# Patient Record
Sex: FEMALE | Race: WHITE | NOT HISPANIC OR LATINO | Employment: OTHER | URBAN - METROPOLITAN AREA
[De-identification: names, ages, dates, MRNs, and addresses within clinical notes are randomized per-mention and may not be internally consistent; named-entity substitution may affect disease eponyms.]

---

## 2017-01-24 ENCOUNTER — CONVERSION ENCOUNTER (OUTPATIENT)
Dept: FAMILY MEDICINE CLINIC | Facility: CLINIC | Age: 77
End: 2017-01-24

## 2018-01-23 ENCOUNTER — CONVERSION ENCOUNTER (OUTPATIENT)
Dept: FAMILY MEDICINE CLINIC | Facility: CLINIC | Age: 78
End: 2018-01-23

## 2018-05-29 ENCOUNTER — OFFICE (AMBULATORY)
Dept: URBAN - METROPOLITAN AREA CLINIC 64 | Facility: CLINIC | Age: 78
End: 2018-05-29
Payer: COMMERCIAL

## 2018-05-29 VITALS
SYSTOLIC BLOOD PRESSURE: 159 MMHG | DIASTOLIC BLOOD PRESSURE: 78 MMHG | HEART RATE: 62 BPM | HEIGHT: 67 IN | WEIGHT: 226 LBS

## 2018-05-29 DIAGNOSIS — K62.5 HEMORRHAGE OF ANUS AND RECTUM: ICD-10-CM

## 2018-05-29 DIAGNOSIS — K90.0 CELIAC DISEASE: ICD-10-CM

## 2018-05-29 PROCEDURE — 99202 OFFICE O/P NEW SF 15 MIN: CPT | Performed by: INTERNAL MEDICINE

## 2018-05-29 RX ORDER — POLYETHYLENE GLYCOL 3350 17 G/17G
34 POWDER, FOR SOLUTION ORAL
Qty: 2 | Refills: 11 | Status: ACTIVE
Start: 2018-05-29

## 2018-06-28 ENCOUNTER — OFFICE (AMBULATORY)
Dept: URBAN - METROPOLITAN AREA CLINIC 64 | Facility: CLINIC | Age: 78
End: 2018-06-28
Payer: COMMERCIAL

## 2018-06-28 VITALS — HEIGHT: 67 IN

## 2018-06-28 DIAGNOSIS — K64.9 UNSPECIFIED HEMORRHOIDS: ICD-10-CM

## 2018-06-28 PROCEDURE — 46221 LIGATION OF HEMORRHOID(S): CPT | Performed by: INTERNAL MEDICINE

## 2018-07-24 ENCOUNTER — CONVERSION ENCOUNTER (OUTPATIENT)
Dept: FAMILY MEDICINE CLINIC | Facility: CLINIC | Age: 78
End: 2018-07-24

## 2018-07-27 ENCOUNTER — OFFICE (AMBULATORY)
Dept: URBAN - METROPOLITAN AREA CLINIC 64 | Facility: CLINIC | Age: 78
End: 2018-07-27
Payer: COMMERCIAL

## 2018-07-27 VITALS — HEIGHT: 67 IN

## 2018-07-27 DIAGNOSIS — K64.1 SECOND DEGREE HEMORRHOIDS: ICD-10-CM

## 2018-07-27 PROCEDURE — 46221 LIGATION OF HEMORRHOID(S): CPT | Performed by: INTERNAL MEDICINE

## 2018-08-16 ENCOUNTER — OFFICE (AMBULATORY)
Dept: URBAN - METROPOLITAN AREA CLINIC 64 | Facility: CLINIC | Age: 78
End: 2018-08-16
Payer: COMMERCIAL

## 2018-08-16 VITALS — HEIGHT: 67 IN

## 2018-08-16 DIAGNOSIS — K64.1 SECOND DEGREE HEMORRHOIDS: ICD-10-CM

## 2018-08-16 PROCEDURE — 46221 LIGATION OF HEMORRHOID(S): CPT | Performed by: INTERNAL MEDICINE

## 2019-01-22 ENCOUNTER — CONVERSION ENCOUNTER (OUTPATIENT)
Dept: FAMILY MEDICINE CLINIC | Facility: CLINIC | Age: 79
End: 2019-01-22

## 2019-06-04 VITALS
SYSTOLIC BLOOD PRESSURE: 128 MMHG | DIASTOLIC BLOOD PRESSURE: 80 MMHG | RESPIRATION RATE: 18 BRPM | BODY MASS INDEX: 35.16 KG/M2 | HEIGHT: 68 IN | HEIGHT: 68 IN | HEIGHT: 68 IN | RESPIRATION RATE: 20 BRPM | SYSTOLIC BLOOD PRESSURE: 168 MMHG | BODY MASS INDEX: 33.86 KG/M2 | HEART RATE: 60 BPM | HEART RATE: 69 BPM | RESPIRATION RATE: 18 BRPM | BODY MASS INDEX: 35.04 KG/M2 | WEIGHT: 223.4 LBS | DIASTOLIC BLOOD PRESSURE: 66 MMHG | OXYGEN SATURATION: 99 % | WEIGHT: 186 LBS | WEIGHT: 232 LBS | WEIGHT: 231.2 LBS | RESPIRATION RATE: 18 BRPM | HEART RATE: 61 BPM | HEART RATE: 64 BPM | SYSTOLIC BLOOD PRESSURE: 136 MMHG | DIASTOLIC BLOOD PRESSURE: 77 MMHG | SYSTOLIC BLOOD PRESSURE: 153 MMHG | DIASTOLIC BLOOD PRESSURE: 77 MMHG

## 2019-07-23 ENCOUNTER — OFFICE VISIT (OUTPATIENT)
Dept: CARDIOLOGY | Facility: CLINIC | Age: 79
End: 2019-07-23

## 2019-07-23 VITALS
HEART RATE: 58 BPM | BODY MASS INDEX: 35.63 KG/M2 | HEIGHT: 67 IN | RESPIRATION RATE: 18 BRPM | OXYGEN SATURATION: 99 % | DIASTOLIC BLOOD PRESSURE: 82 MMHG | SYSTOLIC BLOOD PRESSURE: 142 MMHG | WEIGHT: 227 LBS

## 2019-07-23 DIAGNOSIS — E78.2 MIXED HYPERLIPIDEMIA: ICD-10-CM

## 2019-07-23 DIAGNOSIS — I10 ESSENTIAL HYPERTENSION: Primary | ICD-10-CM

## 2019-07-23 PROCEDURE — 99214 OFFICE O/P EST MOD 30 MIN: CPT | Performed by: INTERNAL MEDICINE

## 2019-07-23 PROCEDURE — 93000 ELECTROCARDIOGRAM COMPLETE: CPT | Performed by: INTERNAL MEDICINE

## 2019-07-23 RX ORDER — HYDROCHLOROTHIAZIDE 12.5 MG/1
12.5 TABLET ORAL DAILY
COMMUNITY

## 2019-07-23 RX ORDER — AMLODIPINE BESYLATE 2.5 MG/1
5 TABLET ORAL DAILY
COMMUNITY
End: 2020-06-25 | Stop reason: HOSPADM

## 2019-07-23 RX ORDER — LOSARTAN POTASSIUM 100 MG/1
100 TABLET ORAL DAILY
COMMUNITY
End: 2019-08-23 | Stop reason: SDUPTHER

## 2019-07-23 RX ORDER — SIMVASTATIN 10 MG
10 TABLET ORAL NIGHTLY
COMMUNITY
End: 2021-05-04

## 2019-07-23 RX ORDER — CARVEDILOL 12.5 MG/1
12.5 TABLET ORAL 2 TIMES DAILY WITH MEALS
COMMUNITY
End: 2020-06-25 | Stop reason: HOSPADM

## 2019-07-23 NOTE — PROGRESS NOTES
Subjective:     Encounter Date:07/23/2019      Patient ID: Susan Garcia is a 79 y.o. female.    Chief Complaint:      Dear Dr. Franks,    Patient is here for follow up.  Patient had a prior history of hypertension hyperlipidemia    denies any new complaints no symptoms of chest pain shortness of breath.  Blood pressure is well poorly controlled controlled at home.   patient denies any new cardiac symptoms and denies any chest pain or shortness of breath.      1.  hypertension:  patient was initially seen and evaluated for poorly controlled hypertension patient did have some extensive workup for secondary hypertension all those were negative and patient is currently doing better blood pressure is well controlled with the current treatment.   patient had a prior renal CT angiogram showing no evidence of any renal artery stenosis   continue home blood pressure monitoring     2.  hyperlipidemia:  patient is currently on statin.  Check lipids and liver function    3.  Mild renal insufficiency, secondary to hypertensive nephropathy.  Continue close monitoring on renal function, follow-up with Nephrology    4.  Peripheral arterial disease, carotid ultrasound with less than 50% stenosis    Labs discussed with the patient  No new cardiac complaints  Patient denies any symptoms of chest pain shortness of breath dizziness or syncope  Continued risk factor modification  Regular exercise   continue current medical therapy for hypertension  Will see patient back in the office in 12 months        The following portions of the patient's history were reviewed and updated as appropriate: allergies, current medications, past family history, past medical history, past social history, past surgical history and problem list.    No Known Allergies      Current Outpatient Medications:   •  amLODIPine (NORVASC) 2.5 MG tablet, Take 2.5 mg by mouth Daily., Disp: , Rfl:   •  carvedilol (COREG) 12.5 MG tablet, Take 12.5 mg by mouth 2  (Two) Times a Day With Meals., Disp: , Rfl:   •  hydrochlorothiazide (HYDRODIURIL) 12.5 MG tablet, Take 12.5 mg by mouth Daily., Disp: , Rfl:   •  losartan (COZAAR) 100 MG tablet, Take 100 mg by mouth Daily., Disp: , Rfl:   •  simvastatin (ZOCOR) 10 MG tablet, Take 10 mg by mouth Every Night., Disp: , Rfl:     History reviewed. No pertinent family history.    Past Medical History:   Diagnosis Date   • Anxiety    • Hyperlipidemia    • Hypertension    • Peripheral arterial disease (CMS/HCC)    • Seizure disorder (CMS/HCC)        Past Surgical History:   Procedure Laterality Date   • APPENDECTOMY     • CHOLECYSTECTOMY     • COLONOSCOPY     • TONSILLECTOMY     • TOTAL ABDOMINAL HYSTERECTOMY     • TOTAL KNEE ARTHROPLASTY Bilateral        Social History     Socioeconomic History   • Marital status:      Spouse name: Not on file   • Number of children: Not on file   • Years of education: Not on file   • Highest education level: Not on file   Tobacco Use   • Smoking status: Never Smoker   • Smokeless tobacco: Never Used   Substance and Sexual Activity   • Alcohol use: No     Frequency: Never   • Drug use: No       Review of Systems   Constitution: Negative for chills, decreased appetite and malaise/fatigue.   HENT: Negative for congestion.    Eyes: Negative for blurred vision and double vision.   Cardiovascular: Negative for chest pain, claudication, dyspnea on exertion, irregular heartbeat, near-syncope, orthopnea, palpitations, paroxysmal nocturnal dyspnea and syncope.   Respiratory: Negative for cough and shortness of breath.    Hematologic/Lymphatic: Negative for adenopathy. Does not bruise/bleed easily.   Skin: Negative for rash.   Gastrointestinal: Negative for bloating and abdominal pain.   Neurological: Negative for dizziness and focal weakness.            Objective:         Vitals:    07/23/19 0936   BP: 142/82   Pulse: 58   Resp: 18   SpO2: 99%       Physical Exam   Constitutional: She is oriented to  person, place, and time. She appears well-developed and well-nourished.   HENT:   Head: Normocephalic and atraumatic.   Eyes: Conjunctivae are normal. Pupils are equal, round, and reactive to light.   Neck: Normal range of motion. Neck supple. No thyromegaly present.   Cardiovascular: Normal rate, regular rhythm, S1 normal, S2 normal and intact distal pulses.   Pulmonary/Chest: Effort normal and breath sounds normal.   Abdominal: Soft. Bowel sounds are normal.   Musculoskeletal: She exhibits no edema.   Neurological: She is alert and oriented to person, place, and time.   Skin: Skin is warm.   Nursing note and vitals reviewed.      EKG: normal EKG, normal sinus rhythm, unchanged from previous tracings, normal sinus rhythm, nonspecific ST and T waves changes.  Ventricular rate is 58.

## 2019-08-26 RX ORDER — LOSARTAN POTASSIUM 100 MG/1
TABLET ORAL
Qty: 90 TABLET | Refills: 0 | Status: SHIPPED | OUTPATIENT
Start: 2019-08-26 | End: 2019-11-26 | Stop reason: SDUPTHER

## 2019-11-26 RX ORDER — LOSARTAN POTASSIUM 100 MG/1
TABLET ORAL
Qty: 90 TABLET | Refills: 0 | Status: SHIPPED | OUTPATIENT
Start: 2019-11-26 | End: 2020-02-25

## 2020-02-25 RX ORDER — LOSARTAN POTASSIUM 100 MG/1
TABLET ORAL
Qty: 90 TABLET | Refills: 0 | Status: SHIPPED | OUTPATIENT
Start: 2020-02-25 | End: 2020-05-26

## 2020-05-26 RX ORDER — LOSARTAN POTASSIUM 100 MG/1
TABLET ORAL
Qty: 90 TABLET | Refills: 0 | Status: SHIPPED | OUTPATIENT
Start: 2020-05-26 | End: 2020-08-27

## 2020-06-23 ENCOUNTER — APPOINTMENT (OUTPATIENT)
Dept: GENERAL RADIOLOGY | Facility: HOSPITAL | Age: 80
End: 2020-06-23

## 2020-06-23 ENCOUNTER — HOSPITAL ENCOUNTER (OUTPATIENT)
Facility: HOSPITAL | Age: 80
Setting detail: OBSERVATION
Discharge: HOME | End: 2020-06-25
Attending: INTERNAL MEDICINE | Admitting: HOSPITALIST

## 2020-06-23 DIAGNOSIS — I48.91 ATRIAL FIBRILLATION, UNSPECIFIED TYPE (HCC): Primary | ICD-10-CM

## 2020-06-23 PROBLEM — M62.838 MUSCLE SPASM: Chronic | Status: ACTIVE | Noted: 2020-06-23

## 2020-06-23 PROBLEM — E78.5 HYPERLIPIDEMIA: Status: ACTIVE | Noted: 2020-06-23

## 2020-06-23 PROBLEM — G40.909 SEIZURE DISORDER (HCC): Status: ACTIVE | Noted: 2020-06-23

## 2020-06-23 PROBLEM — N18.30 CHRONIC RENAL INSUFFICIENCY, STAGE III (MODERATE): Status: ACTIVE | Noted: 2019-03-01

## 2020-06-23 PROBLEM — E66.9 OBESITY (BMI 30-39.9): Chronic | Status: ACTIVE | Noted: 2020-06-23

## 2020-06-23 PROBLEM — N18.30 CHRONIC RENAL INSUFFICIENCY, STAGE III (MODERATE): Chronic | Status: ACTIVE | Noted: 2019-03-01

## 2020-06-23 LAB
ALBUMIN SERPL-MCNC: 4.2 G/DL (ref 3.5–5.2)
ALBUMIN/GLOB SERPL: 1.2 G/DL
ALP SERPL-CCNC: 126 U/L (ref 39–117)
ALT SERPL W P-5'-P-CCNC: 13 U/L (ref 1–33)
ANION GAP SERPL CALCULATED.3IONS-SCNC: 12 MMOL/L (ref 5–15)
AST SERPL-CCNC: 16 U/L (ref 1–32)
BACTERIA UR QL AUTO: ABNORMAL /HPF
BASOPHILS # BLD AUTO: 0 10*3/MM3 (ref 0–0.2)
BASOPHILS NFR BLD AUTO: 0.3 % (ref 0–1.5)
BILIRUB SERPL-MCNC: 0.4 MG/DL (ref 0.2–1.2)
BILIRUB UR QL STRIP: NEGATIVE
BUN BLD-MCNC: 35 MG/DL (ref 8–23)
BUN BLD-MCNC: ABNORMAL MG/DL
BUN/CREAT SERPL: ABNORMAL
CALCIUM SPEC-SCNC: 9.3 MG/DL (ref 8.6–10.5)
CHLORIDE SERPL-SCNC: 98 MMOL/L (ref 98–107)
CLARITY UR: CLEAR
CO2 SERPL-SCNC: 23 MMOL/L (ref 22–29)
COLOR UR: YELLOW
CREAT BLD-MCNC: 1.11 MG/DL (ref 0.57–1)
DEPRECATED RDW RBC AUTO: 56 FL (ref 37–54)
EOSINOPHIL # BLD AUTO: 0 10*3/MM3 (ref 0–0.4)
EOSINOPHIL NFR BLD AUTO: 0.3 % (ref 0.3–6.2)
ERYTHROCYTE [DISTWIDTH] IN BLOOD BY AUTOMATED COUNT: 17 % (ref 12.3–15.4)
GFR SERPL CREATININE-BSD FRML MDRD: 48 ML/MIN/1.73
GLOBULIN UR ELPH-MCNC: 3.4 GM/DL
GLUCOSE BLD-MCNC: 146 MG/DL (ref 65–99)
GLUCOSE UR STRIP-MCNC: NEGATIVE MG/DL
HCT VFR BLD AUTO: 38.4 % (ref 34–46.6)
HGB BLD-MCNC: 13.5 G/DL (ref 12–15.9)
HGB UR QL STRIP.AUTO: ABNORMAL
HYALINE CASTS UR QL AUTO: ABNORMAL /LPF
KETONES UR QL STRIP: NEGATIVE
LEUKOCYTE ESTERASE UR QL STRIP.AUTO: NEGATIVE
LIPASE SERPL-CCNC: 74 U/L (ref 13–60)
LYMPHOCYTES # BLD AUTO: 1 10*3/MM3 (ref 0.7–3.1)
LYMPHOCYTES NFR BLD AUTO: 8.6 % (ref 19.6–45.3)
MCH RBC QN AUTO: 33.8 PG (ref 26.6–33)
MCHC RBC AUTO-ENTMCNC: 35 G/DL (ref 31.5–35.7)
MCV RBC AUTO: 96.4 FL (ref 79–97)
MONOCYTES # BLD AUTO: 1.2 10*3/MM3 (ref 0.1–0.9)
MONOCYTES NFR BLD AUTO: 10.2 % (ref 5–12)
NEUTROPHILS # BLD AUTO: 9.7 10*3/MM3 (ref 1.7–7)
NEUTROPHILS NFR BLD AUTO: 80.6 % (ref 42.7–76)
NITRITE UR QL STRIP: NEGATIVE
NRBC BLD AUTO-RTO: 0.1 /100 WBC (ref 0–0.2)
NT-PROBNP SERPL-MCNC: 285.8 PG/ML (ref 5–1800)
PH UR STRIP.AUTO: 6.5 [PH] (ref 5–8)
PLATELET # BLD AUTO: 358 10*3/MM3 (ref 140–450)
PMV BLD AUTO: 6.9 FL (ref 6–12)
POTASSIUM BLD-SCNC: 3.7 MMOL/L (ref 3.5–5.2)
PROT SERPL-MCNC: 7.6 G/DL (ref 6–8.5)
PROT UR QL STRIP: NEGATIVE
RBC # BLD AUTO: 3.99 10*6/MM3 (ref 3.77–5.28)
RBC # UR: ABNORMAL /HPF
REF LAB TEST METHOD: ABNORMAL
SODIUM BLD-SCNC: 133 MMOL/L (ref 136–145)
SP GR UR STRIP: 1.01 (ref 1–1.03)
SQUAMOUS #/AREA URNS HPF: ABNORMAL /HPF
TROPONIN T SERPL-MCNC: <0.01 NG/ML (ref 0–0.03)
TSH SERPL DL<=0.05 MIU/L-ACNC: 1.24 UIU/ML (ref 0.27–4.2)
UROBILINOGEN UR QL STRIP: ABNORMAL
WBC NRBC COR # BLD: 12 10*3/MM3 (ref 3.4–10.8)
WBC UR QL AUTO: ABNORMAL /HPF

## 2020-06-23 PROCEDURE — 96374 THER/PROPH/DIAG INJ IV PUSH: CPT

## 2020-06-23 PROCEDURE — 99219 PR INITIAL OBSERVATION CARE/DAY 50 MINUTES: CPT | Performed by: PHYSICIAN ASSISTANT

## 2020-06-23 PROCEDURE — 93005 ELECTROCARDIOGRAM TRACING: CPT | Performed by: INTERNAL MEDICINE

## 2020-06-23 PROCEDURE — 84484 ASSAY OF TROPONIN QUANT: CPT | Performed by: PHYSICIAN ASSISTANT

## 2020-06-23 PROCEDURE — P9612 CATHETERIZE FOR URINE SPEC: HCPCS

## 2020-06-23 PROCEDURE — 85025 COMPLETE CBC W/AUTO DIFF WBC: CPT | Performed by: PHYSICIAN ASSISTANT

## 2020-06-23 PROCEDURE — G0378 HOSPITAL OBSERVATION PER HR: HCPCS

## 2020-06-23 PROCEDURE — 96372 THER/PROPH/DIAG INJ SC/IM: CPT

## 2020-06-23 PROCEDURE — 71045 X-RAY EXAM CHEST 1 VIEW: CPT

## 2020-06-23 PROCEDURE — 93005 ELECTROCARDIOGRAM TRACING: CPT

## 2020-06-23 PROCEDURE — 81001 URINALYSIS AUTO W/SCOPE: CPT | Performed by: PHYSICIAN ASSISTANT

## 2020-06-23 PROCEDURE — 84443 ASSAY THYROID STIM HORMONE: CPT | Performed by: PHYSICIAN ASSISTANT

## 2020-06-23 PROCEDURE — 99284 EMERGENCY DEPT VISIT MOD MDM: CPT

## 2020-06-23 PROCEDURE — 80053 COMPREHEN METABOLIC PANEL: CPT | Performed by: PHYSICIAN ASSISTANT

## 2020-06-23 PROCEDURE — 83880 ASSAY OF NATRIURETIC PEPTIDE: CPT | Performed by: PHYSICIAN ASSISTANT

## 2020-06-23 PROCEDURE — 83690 ASSAY OF LIPASE: CPT | Performed by: PHYSICIAN ASSISTANT

## 2020-06-23 PROCEDURE — 25010000002 ENOXAPARIN PER 10 MG: Performed by: PHYSICIAN ASSISTANT

## 2020-06-23 RX ORDER — DILTIAZEM HYDROCHLORIDE 5 MG/ML
10 INJECTION INTRAVENOUS ONCE
Status: COMPLETED | OUTPATIENT
Start: 2020-06-23 | End: 2020-06-23

## 2020-06-23 RX ORDER — ONDANSETRON 2 MG/ML
4 INJECTION INTRAMUSCULAR; INTRAVENOUS EVERY 6 HOURS PRN
Status: DISCONTINUED | OUTPATIENT
Start: 2020-06-23 | End: 2020-06-25 | Stop reason: HOSPADM

## 2020-06-23 RX ORDER — ATORVASTATIN CALCIUM 10 MG/1
10 TABLET, FILM COATED ORAL DAILY
Status: DISCONTINUED | OUTPATIENT
Start: 2020-06-24 | End: 2020-06-25 | Stop reason: HOSPADM

## 2020-06-23 RX ORDER — DULOXETIN HYDROCHLORIDE 30 MG/1
30 CAPSULE, DELAYED RELEASE ORAL DAILY
COMMUNITY

## 2020-06-23 RX ORDER — SODIUM CHLORIDE 0.9 % (FLUSH) 0.9 %
10 SYRINGE (ML) INJECTION EVERY 12 HOURS SCHEDULED
Status: DISCONTINUED | OUTPATIENT
Start: 2020-06-23 | End: 2020-06-25 | Stop reason: HOSPADM

## 2020-06-23 RX ORDER — METHOCARBAMOL 500 MG/1
500 TABLET, FILM COATED ORAL 3 TIMES DAILY PRN
Status: DISCONTINUED | OUTPATIENT
Start: 2020-06-23 | End: 2020-06-25 | Stop reason: HOSPADM

## 2020-06-23 RX ORDER — HYDROCHLOROTHIAZIDE 12.5 MG/1
12.5 TABLET ORAL DAILY
Status: DISCONTINUED | OUTPATIENT
Start: 2020-06-24 | End: 2020-06-25 | Stop reason: HOSPADM

## 2020-06-23 RX ORDER — SODIUM CHLORIDE 0.9 % (FLUSH) 0.9 %
10 SYRINGE (ML) INJECTION AS NEEDED
Status: DISCONTINUED | OUTPATIENT
Start: 2020-06-23 | End: 2020-06-25 | Stop reason: HOSPADM

## 2020-06-23 RX ORDER — ALUMINA, MAGNESIA, AND SIMETHICONE 2400; 2400; 240 MG/30ML; MG/30ML; MG/30ML
15 SUSPENSION ORAL EVERY 6 HOURS PRN
Status: DISCONTINUED | OUTPATIENT
Start: 2020-06-23 | End: 2020-06-25 | Stop reason: HOSPADM

## 2020-06-23 RX ORDER — POTASSIUM CHLORIDE 20 MEQ/1
40 TABLET, EXTENDED RELEASE ORAL AS NEEDED
Status: DISCONTINUED | OUTPATIENT
Start: 2020-06-23 | End: 2020-06-25 | Stop reason: HOSPADM

## 2020-06-23 RX ORDER — MAGNESIUM SULFATE HEPTAHYDRATE 40 MG/ML
2 INJECTION, SOLUTION INTRAVENOUS AS NEEDED
Status: DISCONTINUED | OUTPATIENT
Start: 2020-06-23 | End: 2020-06-25 | Stop reason: HOSPADM

## 2020-06-23 RX ORDER — ASPIRIN 81 MG/1
81 TABLET, CHEWABLE ORAL DAILY
Status: DISCONTINUED | OUTPATIENT
Start: 2020-06-24 | End: 2020-06-24

## 2020-06-23 RX ORDER — ONDANSETRON 4 MG/1
4 TABLET, FILM COATED ORAL EVERY 8 HOURS PRN
Status: DISCONTINUED | OUTPATIENT
Start: 2020-06-23 | End: 2020-06-25 | Stop reason: HOSPADM

## 2020-06-23 RX ORDER — CHOLECALCIFEROL (VITAMIN D3) 125 MCG
5 CAPSULE ORAL NIGHTLY PRN
Status: DISCONTINUED | OUTPATIENT
Start: 2020-06-23 | End: 2020-06-25 | Stop reason: HOSPADM

## 2020-06-23 RX ORDER — ACETAMINOPHEN 325 MG/1
650 TABLET ORAL EVERY 4 HOURS PRN
Status: DISCONTINUED | OUTPATIENT
Start: 2020-06-23 | End: 2020-06-25 | Stop reason: HOSPADM

## 2020-06-23 RX ORDER — NITROGLYCERIN 0.4 MG/1
0.4 TABLET SUBLINGUAL
Status: DISCONTINUED | OUTPATIENT
Start: 2020-06-23 | End: 2020-06-25 | Stop reason: HOSPADM

## 2020-06-23 RX ORDER — AMLODIPINE BESYLATE 5 MG/1
5 TABLET ORAL DAILY
Status: DISCONTINUED | OUTPATIENT
Start: 2020-06-24 | End: 2020-06-25 | Stop reason: HOSPADM

## 2020-06-23 RX ORDER — CARVEDILOL 6.25 MG/1
12.5 TABLET ORAL 2 TIMES DAILY WITH MEALS
Status: DISCONTINUED | OUTPATIENT
Start: 2020-06-23 | End: 2020-06-24

## 2020-06-23 RX ORDER — ONDANSETRON 4 MG/1
4 TABLET, FILM COATED ORAL EVERY 8 HOURS PRN
COMMUNITY
End: 2020-11-03

## 2020-06-23 RX ORDER — MAGNESIUM SULFATE 1 G/100ML
1 INJECTION INTRAVENOUS AS NEEDED
Status: DISCONTINUED | OUTPATIENT
Start: 2020-06-23 | End: 2020-06-25 | Stop reason: HOSPADM

## 2020-06-23 RX ORDER — BISACODYL 10 MG
10 SUPPOSITORY, RECTAL RECTAL DAILY PRN
Status: DISCONTINUED | OUTPATIENT
Start: 2020-06-23 | End: 2020-06-25 | Stop reason: HOSPADM

## 2020-06-23 RX ORDER — ACETAMINOPHEN 650 MG/1
650 SUPPOSITORY RECTAL EVERY 4 HOURS PRN
Status: DISCONTINUED | OUTPATIENT
Start: 2020-06-23 | End: 2020-06-25 | Stop reason: HOSPADM

## 2020-06-23 RX ORDER — ALPRAZOLAM 0.25 MG/1
0.25 TABLET ORAL NIGHTLY PRN
Status: DISCONTINUED | OUTPATIENT
Start: 2020-06-23 | End: 2020-06-25 | Stop reason: HOSPADM

## 2020-06-23 RX ORDER — LOSARTAN POTASSIUM 50 MG/1
100 TABLET ORAL DAILY
Status: DISCONTINUED | OUTPATIENT
Start: 2020-06-24 | End: 2020-06-25 | Stop reason: HOSPADM

## 2020-06-23 RX ORDER — METHOCARBAMOL 500 MG/1
500 TABLET, FILM COATED ORAL 3 TIMES DAILY PRN
COMMUNITY
End: 2020-11-03

## 2020-06-23 RX ORDER — ONDANSETRON 4 MG/1
4 TABLET, FILM COATED ORAL EVERY 6 HOURS PRN
Status: DISCONTINUED | OUTPATIENT
Start: 2020-06-23 | End: 2020-06-25 | Stop reason: HOSPADM

## 2020-06-23 RX ORDER — ASPIRIN 81 MG/1
81 TABLET, CHEWABLE ORAL DAILY
COMMUNITY

## 2020-06-23 RX ORDER — ACETAMINOPHEN 160 MG/5ML
650 SOLUTION ORAL EVERY 4 HOURS PRN
Status: DISCONTINUED | OUTPATIENT
Start: 2020-06-23 | End: 2020-06-25 | Stop reason: HOSPADM

## 2020-06-23 RX ORDER — ALPRAZOLAM 0.25 MG/1
0.25 TABLET ORAL NIGHTLY PRN
COMMUNITY
End: 2021-05-04

## 2020-06-23 RX ADMIN — CARVEDILOL 12.5 MG: 6.25 TABLET, FILM COATED ORAL at 21:30

## 2020-06-23 RX ADMIN — ENOXAPARIN SODIUM 100 MG: 100 INJECTION SUBCUTANEOUS at 21:30

## 2020-06-23 RX ADMIN — Medication 10 ML: at 21:30

## 2020-06-23 RX ADMIN — DILTIAZEM HYDROCHLORIDE 10 MG: 5 INJECTION INTRAVENOUS at 17:30

## 2020-06-23 RX ADMIN — SODIUM CHLORIDE 500 ML: 0.9 INJECTION, SOLUTION INTRAVENOUS at 18:07

## 2020-06-23 NOTE — H&P
[facility] Medicine Services      Patient Name: Susan Garcia  : 1940  MRN: [id number]  Primary Care Physician: Abhay Benavides MD  Date of admission: 2020    Patient Care Team:  Abhay Benavides MD as PCP - General (Internal Medicine)  Cecilia Caputo MD as Consulting Physician (Cardiology)          Subjective   History Present Illness     Chief Complaint:   Chief Complaint   Patient presents with   • Rapid Heart Rate     onset this afternoon.       Ms. Garcia is a 79 y.o. female presents to [facility] ER on 2020 complaining of heart palpitations that started around 1 PM today.  Patient noticed that her heart rate felt elevated.  She describes her heart palpitations as a heart flipping over in her chest.  Patient states that she had a syncopal episode about 4 or 5 weeks ago that resulted her passing out from standing position and fell to the ground hitting her head.  Patient was not evaluated but had notified her primary care physician and was told that it may be related to her antihypertensive medications.  Patient sees  for hypertension, hyperlipidemia, peripheral artery disease.  Patient recently finished a 7-day course of prednisone this morning as well.  Patient also stopped taking her Cymbalta 2 days ago because she thought it was making her sick.  Patient denies fever, chills, cough, congestion, chest pain, shortness of breath, abdominal pain, nausea, vomiting or diarrhea or swelling in her legs bilaterally.    In the ED, initial troponin negative, proBNP normal, sodium 133, serum creatinine of 1.11, BUN of 35.  GFR of 48.  TSH normal at 1.2.  Lipase mildly elevated at 74.  White blood cell count mildly elevated at 12.0 (likely secondary to steroids).  UA unremarkable.  EKG reported atrial fibrillation rate of 94 with left ventricular hypertrophy.  Chest x-ray shows mild pulmonary edema.  Patient is afebrile,  pulse in the 70s, on room air oxygen and 97% SPO2 and blood pressure normotensive.  Patient given 10 mg Cardizem bolus in ED and 500 normal saline bolus.      Review of Systems   Constitution: Negative. Negative for chills, fever and malaise/fatigue.   HENT: Negative.    Cardiovascular: Positive for palpitations and syncope. Negative for chest pain, dyspnea on exertion and leg swelling.   Respiratory: Negative.  Negative for cough and shortness of breath.    Skin: Negative.    Musculoskeletal: Negative.    Gastrointestinal: Negative.  Negative for abdominal pain, constipation, diarrhea, nausea and vomiting.   Genitourinary: Negative.    Psychiatric/Behavioral: Negative.            Personal History     Past Medical History:   Past Medical History:   Diagnosis Date   • Anxiety    • Atrial fibrillation (CMS/HCC)    • Hyperlipidemia    • Hypertension    • Obesity (BMI 30-39.9) 6/23/2020   • Peripheral arterial disease (CMS/HCC)    • Seizure disorder (CMS/HCC)        Surgical History:      Past Surgical History:   Procedure Laterality Date   • APPENDECTOMY     • CHOLECYSTECTOMY     • COLONOSCOPY     • TONSILLECTOMY     • TOTAL ABDOMINAL HYSTERECTOMY     • TOTAL KNEE ARTHROPLASTY Bilateral            Family History: family history includes COPD in her mother; Stroke in her father. Otherwise pertinent FHx was reviewed and unremarkable.     Social History:  reports that she has never smoked. She has never used smokeless tobacco. She reports that she does not drink alcohol or use drugs.      Medications:  Prior to Admission medications    Medication Sig Start Date End Date Taking? Authorizing Provider   ALPRAZolam (XANAX) 0.25 MG tablet Take 0.25 mg by mouth At Night As Needed for Anxiety.   Yes Uri Huntley MD   amLODIPine (NORVASC) 2.5 MG tablet Take 5 mg by mouth Daily.   Yes ProviderUri MD   aspirin 81 MG chewable tablet Chew 81 mg Daily.   Yes ProviderUri MD   carvedilol (COREG) 12.5 MG  tablet Take 12.5 mg by mouth 2 (Two) Times a Day With Meals.   Yes Uri Huntley MD   DULoxetine (CYMBALTA) 30 MG capsule Take 30 mg by mouth Daily.   Yes Uri Huntley MD   hydrochlorothiazide (HYDRODIURIL) 12.5 MG tablet Take 12.5 mg by mouth Daily.   Yes Uri Huntley MD   losartan (COZAAR) 100 MG tablet TAKE 1 TABLET BY MOUTH EVERY DAY 5/26/20  Yes Cecilia Caputo MD   methocarbamol (ROBAXIN) 500 MG tablet Take 500 mg by mouth 3 (Three) Times a Day As Needed for Muscle Spasms.   Yes Uri Huntley MD   ondansetron (ZOFRAN) 4 MG tablet Take 4 mg by mouth Every 8 (Eight) Hours As Needed for Nausea or Vomiting.   Yes Uri Huntley MD   simvastatin (ZOCOR) 10 MG tablet Take 10 mg by mouth Every Night.   Yes Uri Huntley MD       Allergies:  No Known Allergies    Objective   Objective     Vital Signs  Temp:  [97.7 °F (36.5 °C)-98.5 °F (36.9 °C)] 97.7 °F (36.5 °C)  Heart Rate:  [65-92] 76  Resp:  [16-17] 17  BP: (104-194)/(68-89) 133/79  SpO2:  [95 %-97 %] 97 %  on   ;   Device (Oxygen Therapy): room air  Body mass index is 32.52 kg/m².    Physical Exam   Constitutional: She is oriented to person, place, and time. She appears well-developed and well-nourished. No distress.   HENT:   Head: Normocephalic and atraumatic.   Nose: Nose normal.   Mouth/Throat: No oropharyngeal exudate.   Eyes: Pupils are equal, round, and reactive to light. Conjunctivae and EOM are normal.   Neck: Normal range of motion.   Cardiovascular: Normal rate, normal heart sounds and intact distal pulses.   S1, S2 audible.  Irregularly irregular rhythm   Pulmonary/Chest: Effort normal and breath sounds normal. No respiratory distress. She has no wheezes. She has no rales.   On room air.   Abdominal: Soft. Bowel sounds are normal. She exhibits no distension. There is no tenderness.   Musculoskeletal: Normal range of motion. She exhibits no edema, tenderness or deformity.   Neurological: She is  alert and oriented to person, place, and time. No cranial nerve deficit.   Skin: Skin is warm. No rash noted. She is not diaphoretic. No erythema.   Psychiatric: She has a normal mood and affect. Her behavior is normal.   Nursing note and vitals reviewed.      Results Review:  I have personally reviewed most recent cardiac tracings, lab results and radiology images and interpretations and agree with findings.    Results from last 7 days   Lab Units 06/23/20  1732   WBC 10*3/mm3 12.00*   HEMOGLOBIN g/dL 13.5   HEMATOCRIT % 38.4   PLATELETS 10*3/mm3 358     Results from last 7 days   Lab Units 06/23/20  1732   SODIUM mmol/L 133*   POTASSIUM mmol/L 3.7   CHLORIDE mmol/L 98   CO2 mmol/L 23.0   BUN  35*   CREATININE mg/dL 1.11*   GLUCOSE mg/dL 146*   CALCIUM mg/dL 9.3   ALT (SGPT) U/L 13   AST (SGOT) U/L 16   TROPONIN T ng/mL <0.010   PROBNP pg/mL 285.8     Estimated Creatinine Clearance: 51.7 mL/min (A) (by C-G formula based on SCr of 1.11 mg/dL (H)).  Brief Urine Lab Results  (Last result in the past 365 days)      Color   Clarity   Blood   Leuk Est   Nitrite   Protein   CREAT   Urine HCG        06/23/20 1747 Yellow Clear Trace Negative Negative Negative               Microbiology Results (last 10 days)     ** No results found for the last 240 hours. **          ECG/EMG Results (most recent)     None                    Xr Chest 1 View    Result Date: 6/23/2020  Mild pulmonary edema  Electronically Signed By-Christ Dias On:6/23/2020 5:25 PM This report was finalized on [id number] by  Christ Dias, .        Estimated Creatinine Clearance: 51.7 mL/min (A) (by C-G formula based on SCr of 1.11 mg/dL (H)).    Assessment/Plan   Assessment/Plan       Active Hospital Problems    Diagnosis  POA   • **Atrial fibrillation (CMS/HCC) [I48.91]  Yes   • Muscle spasm [M62.838]  Yes   • Obesity (BMI 30-39.9) [E66.9]  Yes   • Chronic renal insufficiency, stage III (moderate) (CMS/HCC) [N18.3]  Yes   • Anxiety disorder [F41.9]  Yes   •  Celiac disease [K90.0]  Yes   • Dyslipidemia [E78.5]  Yes   • Essential hypertension [I10]  Yes      Resolved Hospital Problems   No resolved problems to display.       New onset atrial fibrillation  -No prior history of atrial fibrillation  -EKG reported atrial fibrillation rate of 94 with left ventricular hypertrophy.  - initial troponin negative, proBNP normal  -TSH normal at 1.2  -Chest x-ray shows mild pulmonary edema.    -Patient is afebrile, pulse in the 70s, on room air oxygen and 97% SPO2 and blood pressure normotensive.    -Patient given 10 mg Cardizem bolus in ED and 500 normal saline bolus.  -Cardiology consult, Dr. Caputo  -Start therapeutic Lovenox  -Check serial troponins, echo  -Cardiac monitoring, monitor heart rate  -Heart healthy diet until n.p.o. Midnight    Essential hypertension, moderately controlled  -Continue home Norvasc, Coreg, hydrochlorothiazide, Cozaar    Muscle spasms  -Continue home Robaxin    Hyperlipidemia  -Continue home statin    CKD stage III  -Creatinine clearance of 51.7  -Monitor BMP    Anxiety  -Continue home Xanax, inspect verified    Celiac disease  -Gluten-free diet    History of seizure disorder  -Not on home medication for this    Obesity, BMI 32.5  -Encourage lifestyle modifications      VTE Prophylaxis -therapeutic Lovenox  Mechanical Order History:     None      Pharmalogical Order History:     None          CODE STATUS:    Code Status and Medical Interventions:   Ordered at: 06/23/20 2010     Code Status:    CPR     Medical Interventions (Level of Support Prior to Arrest):    Full       This patient has been examined wearing appropriate Personal Protective Equipment and discussed with hospital infection control department. 06/23/20      I discussed the patient's findings and my recommendations with patient.        Electronically signed by JEFFRY Sloan, 06/23/20, 7:30 PM.  [facility] Hospitalist Team

## 2020-06-23 NOTE — ED PROVIDER NOTES
Subjective   History: Patient is a 79-year-old female who presents to the ER after having heart palpitations this afternoon.  She reports that she was at home and felt like her heart was beating out of her chest.  She took her blood pressure medication and reports that it was ranging from 150-190/130.  She reports it is come down somewhat as of now.  She reports her heart rate is normally in the 50s and 60s and she can tell is elevated still.  She does report that 3 to 4 weeks ago she had a syncopal episode she has not been seen or followed up for this.  She has not had any recurrence.      Onset: 1 day  Location: chest  Duration: constant  Character: palpitations  Aggravating/Alleviating factors: None  Radiation None  Severity: moderate            Review of Systems   Constitutional: Negative for chills, diaphoresis, fatigue and fever.   Respiratory: Negative for cough, choking and shortness of breath.    Cardiovascular: Positive for palpitations. Negative for chest pain and leg swelling.   Gastrointestinal: Negative for abdominal pain, nausea and vomiting.   Genitourinary: Negative.    Musculoskeletal: Negative.    Skin: Negative.    Neurological: Negative.    Psychiatric/Behavioral: Negative.        Past Medical History:   Diagnosis Date   • Anxiety    • Hyperlipidemia    • Hypertension    • Peripheral arterial disease (CMS/HCC)    • Seizure disorder (CMS/HCC)        No Known Allergies    Past Surgical History:   Procedure Laterality Date   • APPENDECTOMY     • CHOLECYSTECTOMY     • COLONOSCOPY     • TONSILLECTOMY     • TOTAL ABDOMINAL HYSTERECTOMY     • TOTAL KNEE ARTHROPLASTY Bilateral        No family history on file.    Social History     Socioeconomic History   • Marital status:      Spouse name: Not on file   • Number of children: Not on file   • Years of education: Not on file   • Highest education level: Not on file   Tobacco Use   • Smoking status: Never Smoker   • Smokeless tobacco: Never Used    Substance and Sexual Activity   • Alcohol use: No     Frequency: Never   • Drug use: No           Objective   Physical Exam   Constitutional: She is oriented to person, place, and time. She appears well-developed and well-nourished.   HENT:   Head: Normocephalic and atraumatic.   Eyes: Pupils are equal, round, and reactive to light.   Neck: Normal range of motion.   Cardiovascular: Normal rate and regular rhythm.   Pulmonary/Chest: Effort normal and breath sounds normal.   Musculoskeletal: Normal range of motion.   Neurological: She is alert and oriented to person, place, and time.   Skin: Skin is warm and dry.   Psychiatric: She has a normal mood and affect. Her behavior is normal.       Procedures           ED Course  ED Course as of Jun 23 1857   Tue Jun 23, 2020 1715 EKG interpreted by ER physician reviewed myself.  Atrial fibrillation probable LVH rate of 94    [MG]      ED Course User Index  [MG] Gracie Mcfarland PA-C      Xr Chest 1 View    Result Date: 6/23/2020  Mild pulmonary edema  Electronically Signed By-Christ Dias On:6/23/2020 5:25 PM This report was finalized on [id number] by  Christ Dias, .    Labs Reviewed   COMPREHENSIVE METABOLIC PANEL - Abnormal; Notable for the following components:       Result Value    Glucose 146 (*)     Creatinine 1.11 (*)     Sodium 133 (*)     Alkaline Phosphatase 126 (*)     eGFR Non  Amer 48 (*)     All other components within normal limits    Narrative:     GFR Normal >60  Chronic Kidney Disease <60  Kidney Failure <15     LIPASE - Abnormal; Notable for the following components:    Lipase 74 (*)     All other components within normal limits   URINALYSIS W/ CULTURE IF INDICATED - Abnormal; Notable for the following components:    Blood, UA Trace (*)     All other components within normal limits   CBC WITH AUTO DIFFERENTIAL - Abnormal; Notable for the following components:    WBC 12.00 (*)     MCH 33.8 (*)     RDW 17.0 (*)     RDW-SD 56.0 (*)     Neutrophil  % 80.6 (*)     Lymphocyte % 8.6 (*)     Neutrophils, Absolute 9.70 (*)     Monocytes, Absolute 1.20 (*)     All other components within normal limits   URINALYSIS, MICROSCOPIC ONLY - Abnormal; Notable for the following components:    RBC, UA 3-5 (*)     WBC, UA 0-2 (*)     All other components within normal limits   BUN - Abnormal; Notable for the following components:    BUN 35 (*)     All other components within normal limits   TROPONIN (IN-HOUSE) - Normal    Narrative:     Troponin T Reference Range:  <= 0.03 ng/mL-   Negative for AMI  >0.03 ng/mL-     Abnormal for myocardial necrosis.  Clinicians would have to utilize clinical acumen, EKG, Troponin and serial changes to determine if it is an Acute Myocardial Infarction or myocardial injury due to an underlying chronic condition.       Results may be falsely decreased if patient taking Biotin.     BNP (IN-HOUSE) - Normal    Narrative:     Among patients with dyspnea, NT-proBNP is highly sensitive for the detection of acute congestive heart failure. In addition NT-proBNP of <300 pg/ml effectively rules out acute congestive heart failure with 99% negative predictive value.    Results may be falsely decreased if patient taking Biotin.     CBC AND DIFFERENTIAL    Narrative:     The following orders were created for panel order CBC & Differential.  Procedure                               Abnormality         Status                     ---------                               -----------         ------                     CBC Auto Differential[[id number]]        Abnormal            Final result                 Please view results for these tests on the individual orders.     Medications   sodium chloride 0.9 % flush 10 mL (has no administration in time range)   sodium chloride 0.9 % bolus 500 mL (500 mL Intravenous New Bag 6/23/20 2036)   dilTIAZem (CARDIZEM) injection 10 mg (10 mg Intravenous Given 6/23/20 1688)                                          MDM  Number of  Diagnoses or Management Options  Atrial fibrillation, unspecified type (CMS/HCC):   Diagnosis management comments: I examined the patient using the appropriate personal protective equipment.      DISPOSITION:   Chart Review:  Comorbidity:  has a past medical history of Anxiety, Hyperlipidemia, Hypertension, Peripheral arterial disease (CMS/HCC), and Seizure disorder (CMS/HCC).  Differentials:this list is not all inclusive and does not constitute the entirety of considered causes --> A. fib, sinus tachycardia, CAD, NSTEMI  ECG: interpreted by ER physician and reviewed by myself: Atrial fibrillation rate of 94  Labs: Creatinine 1.11, sodium 133 troponin negative    Imaging: Was interpreted by physician and reviewed by myself:  Xr Chest 1 View    Result Date: 6/23/2020  Mild pulmonary edema  Electronically Signed By-Christ Dias On:6/23/2020 5:25 PM This report was finalized on [id number] by  Christ Dias, .      Disposition/Treatment:  Patient is a 79-year-old female who presents to the ER reporting that she had palpitations earlier.  3 to 4 weeks ago she had a syncopal episode.  She does see Dr. Warren with cardiology for hypertension.  She was found to be in A. fib.  She was given a one-time bolus of Cardizem which brought her rate from 100-70 but she was still in A. fib.  She was brought into the hospitalist for further work-up this is new onset she has no history of A. fib.  She was stable in agreement plan         Amount and/or Complexity of Data Reviewed  Clinical lab tests: reviewed  Tests in the radiology section of CPT®: reviewed  Decide to obtain previous medical records or to obtain history from someone other than the patient: yes    Patient Progress  Patient progress: stable      Final diagnoses:   Atrial fibrillation, unspecified type (CMS/HCC)            Gracie Mcfarland PA-C  06/23/20 3911

## 2020-06-23 NOTE — ED NOTES
Pt was given cardizem, pt had bp drop along with rate now in 60-70s vs 80-90s states that she feels completely fine.      Julia Golden RN  06/23/20 2865

## 2020-06-24 ENCOUNTER — APPOINTMENT (OUTPATIENT)
Dept: CARDIOLOGY | Facility: HOSPITAL | Age: 80
End: 2020-06-24

## 2020-06-24 ENCOUNTER — APPOINTMENT (OUTPATIENT)
Dept: NUCLEAR MEDICINE | Facility: HOSPITAL | Age: 80
End: 2020-06-24

## 2020-06-24 LAB
ANION GAP SERPL CALCULATED.3IONS-SCNC: 10 MMOL/L (ref 5–15)
BASOPHILS # BLD AUTO: 0.1 10*3/MM3 (ref 0–0.2)
BASOPHILS NFR BLD AUTO: 0.5 % (ref 0–1.5)
BH CV ECHO MEAS - % IVS THICK: 28.8 %
BH CV ECHO MEAS - % LVPW THICK: 51.6 %
BH CV ECHO MEAS - ACS: 1.9 CM
BH CV ECHO MEAS - AO MAX PG (FULL): -1.2 MMHG
BH CV ECHO MEAS - AO MAX PG: 5.8 MMHG
BH CV ECHO MEAS - AO MEAN PG (FULL): 0 MMHG
BH CV ECHO MEAS - AO MEAN PG: 3.7 MMHG
BH CV ECHO MEAS - AO ROOT AREA (BSA CORRECTED): 1.4
BH CV ECHO MEAS - AO ROOT AREA: 7.2 CM^2
BH CV ECHO MEAS - AO ROOT DIAM: 3 CM
BH CV ECHO MEAS - AO V2 MAX: 120.7 CM/SEC
BH CV ECHO MEAS - AO V2 MEAN: 93.3 CM/SEC
BH CV ECHO MEAS - AO V2 VTI: 28.1 CM
BH CV ECHO MEAS - AVA(I,A): 3 CM^2
BH CV ECHO MEAS - AVA(I,D): 3 CM^2
BH CV ECHO MEAS - AVA(V,A): 3 CM^2
BH CV ECHO MEAS - AVA(V,D): 3 CM^2
BH CV ECHO MEAS - BSA(HAYCOCK): 2.2 M^2
BH CV ECHO MEAS - BSA: 2.1 M^2
BH CV ECHO MEAS - BZI_BMI: 32.7 KILOGRAMS/M^2
BH CV ECHO MEAS - BZI_METRIC_HEIGHT: 172.7 CM
BH CV ECHO MEAS - BZI_METRIC_WEIGHT: 97.5 KG
BH CV ECHO MEAS - EDV(CUBED): 129.8 ML
BH CV ECHO MEAS - EDV(MOD-SP4): 56.2 ML
BH CV ECHO MEAS - EDV(TEICH): 121.7 ML
BH CV ECHO MEAS - EF(CUBED): 88.1 %
BH CV ECHO MEAS - EF(MOD-BP): 68 %
BH CV ECHO MEAS - EF(MOD-SP4): 67.6 %
BH CV ECHO MEAS - EF(TEICH): 81.8 %
BH CV ECHO MEAS - ESV(CUBED): 15.4 ML
BH CV ECHO MEAS - ESV(MOD-SP4): 18.2 ML
BH CV ECHO MEAS - ESV(TEICH): 22.1 ML
BH CV ECHO MEAS - FS: 50.8 %
BH CV ECHO MEAS - IVS/LVPW: 1.2
BH CV ECHO MEAS - IVSD: 1.3 CM
BH CV ECHO MEAS - IVSS: 1.7 CM
BH CV ECHO MEAS - LA DIMENSION(2D): 4.4 CM
BH CV ECHO MEAS - LV DIASTOLIC VOL/BSA (35-75): 26.7 ML/M^2
BH CV ECHO MEAS - LV MASS(C)D: 245.4 GRAMS
BH CV ECHO MEAS - LV MASS(C)DI: 116.5 GRAMS/M^2
BH CV ECHO MEAS - LV MASS(C)S: 159 GRAMS
BH CV ECHO MEAS - LV MASS(C)SI: 75.4 GRAMS/M^2
BH CV ECHO MEAS - LV MAX PG: 7 MMHG
BH CV ECHO MEAS - LV MEAN PG: 3.7 MMHG
BH CV ECHO MEAS - LV SYSTOLIC VOL/BSA (12-30): 8.6 ML/M^2
BH CV ECHO MEAS - LV V1 MAX: 132.2 CM/SEC
BH CV ECHO MEAS - LV V1 MEAN: 91 CM/SEC
BH CV ECHO MEAS - LV V1 VTI: 30.5 CM
BH CV ECHO MEAS - LVIDD: 5.1 CM
BH CV ECHO MEAS - LVIDS: 2.5 CM
BH CV ECHO MEAS - LVOT AREA: 2.7 CM^2
BH CV ECHO MEAS - LVOT DIAM: 1.9 CM
BH CV ECHO MEAS - LVPWD: 1.1 CM
BH CV ECHO MEAS - LVPWS: 1.7 CM
BH CV ECHO MEAS - MV A MAX VEL: 120.2 CM/SEC
BH CV ECHO MEAS - MV DEC SLOPE: 184.6 CM/SEC^2
BH CV ECHO MEAS - MV DEC TIME: 0.32 SEC
BH CV ECHO MEAS - MV E MAX VEL: 59.2 CM/SEC
BH CV ECHO MEAS - MV E/A: 0.49
BH CV ECHO MEAS - MV MAX PG: 5.7 MMHG
BH CV ECHO MEAS - MV MEAN PG: 1.7 MMHG
BH CV ECHO MEAS - MV V2 MAX: 119 CM/SEC
BH CV ECHO MEAS - MV V2 MEAN: 58.8 CM/SEC
BH CV ECHO MEAS - MV V2 VTI: 33.6 CM
BH CV ECHO MEAS - MVA(VTI): 2.5 CM^2
BH CV ECHO MEAS - PA ACC TIME: 0.1 SEC
BH CV ECHO MEAS - PA MAX PG (FULL): 0.62 MMHG
BH CV ECHO MEAS - PA MAX PG: 4.6 MMHG
BH CV ECHO MEAS - PA MEAN PG (FULL): 0.33 MMHG
BH CV ECHO MEAS - PA MEAN PG: 2.5 MMHG
BH CV ECHO MEAS - PA PR(ACCEL): 32.3 MMHG
BH CV ECHO MEAS - PA V2 MAX: 107.7 CM/SEC
BH CV ECHO MEAS - PA V2 MEAN: 74.8 CM/SEC
BH CV ECHO MEAS - PA V2 VTI: 24.3 CM
BH CV ECHO MEAS - PULM A REVS DUR: 0.11 SEC
BH CV ECHO MEAS - PULM A REVS VEL: 31.8 CM/SEC
BH CV ECHO MEAS - PULM DIAS VEL: 31.2 CM/SEC
BH CV ECHO MEAS - PULM S/D: 2.2
BH CV ECHO MEAS - PULM SYS VEL: 67.7 CM/SEC
BH CV ECHO MEAS - RAP SYSTOLE: 3 MMHG
BH CV ECHO MEAS - RV MAX PG: 4 MMHG
BH CV ECHO MEAS - RV MEAN PG: 2.2 MMHG
BH CV ECHO MEAS - RV V1 MAX: 100.2 CM/SEC
BH CV ECHO MEAS - RV V1 MEAN: 68.6 CM/SEC
BH CV ECHO MEAS - RV V1 VTI: 23.3 CM
BH CV ECHO MEAS - RVDD: 2.9 CM
BH CV ECHO MEAS - RVSP: 25.5 MMHG
BH CV ECHO MEAS - SI(AO): 95.8 ML/M^2
BH CV ECHO MEAS - SI(CUBED): 54.2 ML/M^2
BH CV ECHO MEAS - SI(LVOT): 39.6 ML/M^2
BH CV ECHO MEAS - SI(MOD-SP4): 18 ML/M^2
BH CV ECHO MEAS - SI(TEICH): 47.3 ML/M^2
BH CV ECHO MEAS - SV(AO): 201.9 ML
BH CV ECHO MEAS - SV(CUBED): 114.3 ML
BH CV ECHO MEAS - SV(LVOT): 83.5 ML
BH CV ECHO MEAS - SV(MOD-SP4): 38 ML
BH CV ECHO MEAS - SV(TEICH): 99.6 ML
BH CV ECHO MEAS - TR MAX VEL: 237.3 CM/SEC
BUN BLD-MCNC: 28 MG/DL (ref 8–23)
BUN BLD-MCNC: ABNORMAL MG/DL
BUN/CREAT SERPL: ABNORMAL
CALCIUM SPEC-SCNC: 8.9 MG/DL (ref 8.6–10.5)
CHLORIDE SERPL-SCNC: 101 MMOL/L (ref 98–107)
CO2 SERPL-SCNC: 27 MMOL/L (ref 22–29)
CREAT BLD-MCNC: 1.11 MG/DL (ref 0.57–1)
DEPRECATED RDW RBC AUTO: 56.4 FL (ref 37–54)
EOSINOPHIL # BLD AUTO: 0.3 10*3/MM3 (ref 0–0.4)
EOSINOPHIL NFR BLD AUTO: 1.9 % (ref 0.3–6.2)
ERYTHROCYTE [DISTWIDTH] IN BLOOD BY AUTOMATED COUNT: 17.1 % (ref 12.3–15.4)
GFR SERPL CREATININE-BSD FRML MDRD: 48 ML/MIN/1.73
GLUCOSE BLD-MCNC: 110 MG/DL (ref 65–99)
HCT VFR BLD AUTO: 40.1 % (ref 34–46.6)
HGB BLD-MCNC: 14 G/DL (ref 12–15.9)
LV EF 2D ECHO EST: 65 %
LYMPHOCYTES # BLD AUTO: 2.6 10*3/MM3 (ref 0.7–3.1)
LYMPHOCYTES NFR BLD AUTO: 17.9 % (ref 19.6–45.3)
MAGNESIUM SERPL-MCNC: 2 MG/DL (ref 1.6–2.4)
MAXIMAL PREDICTED HEART RATE: 143 BPM
MCH RBC QN AUTO: 33.6 PG (ref 26.6–33)
MCHC RBC AUTO-ENTMCNC: 34.9 G/DL (ref 31.5–35.7)
MCV RBC AUTO: 96.1 FL (ref 79–97)
MONOCYTES # BLD AUTO: 2.2 10*3/MM3 (ref 0.1–0.9)
MONOCYTES NFR BLD AUTO: 15 % (ref 5–12)
NEUTROPHILS # BLD AUTO: 9.3 10*3/MM3 (ref 1.7–7)
NEUTROPHILS NFR BLD AUTO: 64.7 % (ref 42.7–76)
NRBC BLD AUTO-RTO: 0.2 /100 WBC (ref 0–0.2)
PLATELET # BLD AUTO: 345 10*3/MM3 (ref 140–450)
PMV BLD AUTO: 8.1 FL (ref 6–12)
POTASSIUM BLD-SCNC: 3.9 MMOL/L (ref 3.5–5.2)
RBC # BLD AUTO: 4.17 10*6/MM3 (ref 3.77–5.28)
SARS-COV-2 RNA PNL SPEC NAA+PROBE: NOT DETECTED
SODIUM BLD-SCNC: 138 MMOL/L (ref 136–145)
STRESS TARGET HR: 122 BPM
TROPONIN T SERPL-MCNC: <0.01 NG/ML (ref 0–0.03)
TROPONIN T SERPL-MCNC: <0.01 NG/ML (ref 0–0.03)
WBC NRBC COR # BLD: 14.5 10*3/MM3 (ref 3.4–10.8)

## 2020-06-24 PROCEDURE — 93306 TTE W/DOPPLER COMPLETE: CPT

## 2020-06-24 PROCEDURE — 85025 COMPLETE CBC W/AUTO DIFF WBC: CPT | Performed by: PHYSICIAN ASSISTANT

## 2020-06-24 PROCEDURE — G0378 HOSPITAL OBSERVATION PER HR: HCPCS

## 2020-06-24 PROCEDURE — 84484 ASSAY OF TROPONIN QUANT: CPT | Performed by: PHYSICIAN ASSISTANT

## 2020-06-24 PROCEDURE — 80048 BASIC METABOLIC PNL TOTAL CA: CPT | Performed by: PHYSICIAN ASSISTANT

## 2020-06-24 PROCEDURE — 25010000002 ENOXAPARIN PER 10 MG: Performed by: PHYSICIAN ASSISTANT

## 2020-06-24 PROCEDURE — 78452 HT MUSCLE IMAGE SPECT MULT: CPT | Performed by: INTERNAL MEDICINE

## 2020-06-24 PROCEDURE — A9500 TC99M SESTAMIBI: HCPCS | Performed by: HOSPITALIST

## 2020-06-24 PROCEDURE — 25010000002 REGADENOSON 0.4 MG/5ML SOLUTION: Performed by: HOSPITALIST

## 2020-06-24 PROCEDURE — 93306 TTE W/DOPPLER COMPLETE: CPT | Performed by: INTERNAL MEDICINE

## 2020-06-24 PROCEDURE — 93018 CV STRESS TEST I&R ONLY: CPT | Performed by: INTERNAL MEDICINE

## 2020-06-24 PROCEDURE — 83735 ASSAY OF MAGNESIUM: CPT | Performed by: PHYSICIAN ASSISTANT

## 2020-06-24 PROCEDURE — 99213 OFFICE O/P EST LOW 20 MIN: CPT | Performed by: INTERNAL MEDICINE

## 2020-06-24 PROCEDURE — 87635 SARS-COV-2 COVID-19 AMP PRB: CPT | Performed by: HOSPITALIST

## 2020-06-24 PROCEDURE — 0 TECHNETIUM SESTAMIBI: Performed by: HOSPITALIST

## 2020-06-24 PROCEDURE — 96372 THER/PROPH/DIAG INJ SC/IM: CPT

## 2020-06-24 PROCEDURE — 93017 CV STRESS TEST TRACING ONLY: CPT

## 2020-06-24 PROCEDURE — 99225 PR SBSQ OBSERVATION CARE/DAY 25 MINUTES: CPT | Performed by: HOSPITALIST

## 2020-06-24 PROCEDURE — 78452 HT MUSCLE IMAGE SPECT MULT: CPT

## 2020-06-24 RX ADMIN — ASPIRIN 81 MG 81 MG: 81 TABLET ORAL at 08:15

## 2020-06-24 RX ADMIN — TECHNETIUM TC 99M SESTAMIBI 1 DOSE: 1 INJECTION INTRAVENOUS at 13:50

## 2020-06-24 RX ADMIN — TECHNETIUM TC 99M SESTAMIBI 1 DOSE: 1 INJECTION INTRAVENOUS at 12:26

## 2020-06-24 RX ADMIN — HYDROCHLOROTHIAZIDE 12.5 MG: 12.5 TABLET ORAL at 08:15

## 2020-06-24 RX ADMIN — CARVEDILOL 12.5 MG: 6.25 TABLET, FILM COATED ORAL at 08:15

## 2020-06-24 RX ADMIN — ALPRAZOLAM 0.25 MG: 0.25 TABLET ORAL at 20:54

## 2020-06-24 RX ADMIN — LOSARTAN POTASSIUM 100 MG: 50 TABLET, FILM COATED ORAL at 08:14

## 2020-06-24 RX ADMIN — Medication 10 ML: at 20:26

## 2020-06-24 RX ADMIN — Medication 10 ML: at 08:15

## 2020-06-24 RX ADMIN — CARVEDILOL 12.5 MG: 6.25 TABLET, FILM COATED ORAL at 17:28

## 2020-06-24 RX ADMIN — METHOCARBAMOL TABLETS 500 MG: 500 TABLET, COATED ORAL at 20:53

## 2020-06-24 RX ADMIN — REGADENOSON 0.4 MG: 0.08 INJECTION, SOLUTION INTRAVENOUS at 13:49

## 2020-06-24 RX ADMIN — ENOXAPARIN SODIUM 100 MG: 100 INJECTION SUBCUTANEOUS at 20:25

## 2020-06-24 RX ADMIN — ACETAMINOPHEN 650 MG: 325 TABLET, FILM COATED ORAL at 23:38

## 2020-06-24 RX ADMIN — ATORVASTATIN CALCIUM 10 MG: 10 TABLET, FILM COATED ORAL at 20:25

## 2020-06-24 RX ADMIN — ALUMINUM HYDROXIDE, MAGNESIUM HYDROXIDE, AND DIMETHICONE 15 ML: 400; 400; 40 SUSPENSION ORAL at 17:29

## 2020-06-24 RX ADMIN — ENOXAPARIN SODIUM 100 MG: 100 INJECTION SUBCUTANEOUS at 10:19

## 2020-06-24 RX ADMIN — AMLODIPINE BESYLATE 5 MG: 5 TABLET ORAL at 17:28

## 2020-06-24 NOTE — CONSULTS
Cardiology Consult Note      REQUESTING PHYSICIAN    Jero Alfaro,*    PATIENT IDENTIFICATION  Name: Susan Garcia  Age: 79 y.o.  Sex: female  :  1940  MRN: [id number]               Cardiology assessment and plan    Newly diagnosed atrial fibrillation currently in sinus rhythm  Paroxysmal atrial fibrillation currently in sinus rhythm  Only one EKGs available from yesterday rest of the EKGs that were done in the emergency room and telemetry strips are not available for review unable to get them in the system  Patient is currently in sinus rhythm  History of hypertension  Elevated Justo vascular score  History of syncope few weeks back in the setting of UTI and dehydration  No exertional symptoms of chest pain  Stress test with fixed perfusion defect likely attenuation artifact  Echocardiogram with normal LV systolic function    Plan to start patient on anticoagulation therapy for stroke prophylaxis  Risk benefits and alternatives for anticoagulation therapy discussed with the patient  Holter monitor for 48 hours at the time of discharge  Switch patient from carvedilol to metoprolol for better control of heart rate and also prevention of atrial fibrillation  Risk benefits and alternatives discussed with the patient  Close monitoring of blood pressure and heart rate at home  Okay to discharge from cardiac standpoint  Follow-up in office      Interpretation Summary     · Left ventricular wall thickness is consistent with concentric hypertrophy.  · Estimated EF = 65%.  · Left ventricular systolic function is normal.  · Left atrial cavity size is mildly dilated.  · Left ventricular diastolic dysfunction (grade I) consistent with impaired relaxation.     Interpretation Summary     · Moderate sized severe intensity fixed inferolateral wall perfusion defect with no evidence of any significant reversible ischemia  · Left ventricular ejection fraction is hyperdynamic (Calculated EF >  "70%).  · Gated imaging shows normal wall motion in this territory possibility of underlying attenuation artifact cannot be ruled out based on the present study  · Clinical correlation recommended                REASON FOR CONSULTATION:  79-year-old female with no known history of coronary occlusive disease.  History of hypertension, dyslipidemia, mild renal insufficiency secondary to hypertensive nephropathy, peripheral arterial disease-carotid.  History of seizure disorder.      CC:  Palpitations    HISTORY OF PRESENT ILLNESS:   Patient presented to the emergency department at [facility] 6/23/2020 with complaint of palpitations that began approximately 1 p.m. she describes symptoms as her heart beating very fast\" flip-flopping\".  Patient states that she has been caring for her ill  at home which has been rather physically and emotionally demanding.  She also reports a syncopal episode that occurred about 3 weeks ago.  Patient states she did strike her head but did not go to the emergency department.  She denies any recent headaches or blurred vision.  She denies any lower extremity edema, no further syncopal episodes, no lulu chest pain, shortness of breath.  Patient states she is just generally not felt well over the past 4 weeks.  She is unable to be more specific about this.  EKG performed in the emergency department suggests atrial fibrillation-which is new onset.    Chest x-ray with mild pulmonary edema.  She was given 10 mg Cardizem bolus IV and 500 mL's normal saline  REVIEW OF SYSTEMS:  Pertinent items are noted in HPI, all other systems reviewed and negative    OBJECTIVE       ASSESSMENT/PLAN    Atrial fibrillation (CMS/HCC)    Celiac disease    Chronic renal insufficiency, stage III (moderate) (CMS/HCC)    Dyslipidemia    Essential hypertension    Anxiety disorder    Muscle spasm    Obesity (BMI 30-39.9)    Plan  New onset atrial fibrillation, currently controlled.  She is ruled out for " "any acute coronary syndrome with negative serial cardiac enzymes.  We will schedule nuclear stress testing today to evaluate for evidence of inducible ischemia  Holter monitor has been applied to evaluate for any dysrhythmias  2D echo  Further recommendations following results of diagnostics.        Vital Signs  Visit Vitals  /82 (BP Location: Right arm, Patient Position: Lying)   Pulse 52   Temp 97.5 °F (36.4 °C) (Oral)   Resp 16   Ht 172.7 cm (68\")   Wt 97.9 kg (215 lb 13.3 oz)   SpO2 99%   BMI 32.82 kg/m²     Oxygen Therapy  SpO2: 99 %  Pulse Oximetry Type: Intermittent  Device (Oxygen Therapy): room air  Flowsheet Rows      First Filed Value   Admission Height  172.7 cm (68\") Documented at 06/23/2020 1656   Admission Weight  95.8 kg (211 lb 3 oz) Documented at 06/23/2020 1656        Intake & Output (last 3 days)       06/21 0701 - 06/22 0700 06/22 0701 - 06/23 0700 06/23 0701 - 06/24 0700 06/24 0701 - 06/25 0700    IV Piggyback   500     Total Intake(mL/kg)   500 (5.1)     Net   +500             Urine Unmeasured Occurrence   2 x         Lines, Drains & Airways    Active LDAs     Name:   Placement date:   Placement time:   Site:   Days:    Peripheral IV 06/23/20 1935 Left Antecubital   06/23/20 1935    Antecubital   less than 1                MEDICAL HISTORY    Past Medical History:   Diagnosis Date   • Anxiety    • Atrial fibrillation (CMS/HCC)    • Hyperlipidemia    • Hypertension    • Obesity (BMI 30-39.9) 6/23/2020   • Peripheral arterial disease (CMS/HCC)    • Seizure disorder (CMS/HCC)         SURGICAL HISTORY    Past Surgical History:   Procedure Laterality Date   • APPENDECTOMY     • CHOLECYSTECTOMY     • COLONOSCOPY     • TONSILLECTOMY     • TOTAL ABDOMINAL HYSTERECTOMY     • TOTAL KNEE ARTHROPLASTY Bilateral         FAMILY HISTORY    Family History   Problem Relation Age of Onset   • COPD Mother    • Stroke Father        SOCIAL HISTORY    Social History     Tobacco Use   • Smoking status: Never " "Smoker   • Smokeless tobacco: Never Used   Substance Use Topics   • Alcohol use: No     Frequency: Never        ALLERGIES    No Known Allergies           /82 (BP Location: Right arm, Patient Position: Lying)   Pulse 52   Temp 97.5 °F (36.4 °C) (Oral)   Resp 16   Ht 172.7 cm (68\")   Wt 97.9 kg (215 lb 13.3 oz)   SpO2 99%   BMI 32.82 kg/m²   Intake/Output last 3 shifts:  I/O last 3 completed shifts:  In: 500 [IV Piggyback:500]  Out: -   Intake/Output this shift:  No intake/output data recorded.    PHYSICAL EXAM:    General: Alert, cooperative, no distress, appears stated age  Head:  Normocephalic, atraumatic, mucous membranes moist  Eyes:  Conjunctiva/corneas clear, EOM's intact     Neck:  Supple,  no adenopathy;      Lungs: Clear to auscultation bilaterally, no wheezes rhonchi rales are noted  Chest wall: No tenderness  Heart::  Regular rate and rhythm, S1 and S2 normal, no murmur, rub or gallop  Abdomen: Soft, non-tender, nondistended bowel sounds active  Extremities: No cyanosis, clubbing, or edema groin soft no hematoma.  Pulses: 2+ and symmetric all extremities  Skin:  No rashes or lesions  Neuro/psych: A&O x3. CN II through XII are grossly intact with appropriate affect      Scheduled Meds:        amLODIPine 5 mg Oral Daily   aspirin 81 mg Oral Daily   atorvastatin 10 mg Oral Daily   carvedilol 12.5 mg Oral BID With Meals   enoxaparin 1 mg/kg Subcutaneous Q12H   hydroCHLOROthiazide 12.5 mg Oral Daily   losartan 100 mg Oral Daily   sodium chloride 10 mL Intravenous Q12H       Continuous Infusions:         PRN Meds:    •  acetaminophen **OR** acetaminophen **OR** acetaminophen  •  ALPRAZolam  •  aluminum-magnesium hydroxide-simethicone  •  bisacodyl  •  magnesium hydroxide  •  magnesium sulfate **OR** magnesium sulfate in D5W 1g/100mL (PREMIX)  •  melatonin  •  methocarbamol  •  nitroglycerin  •  ondansetron **OR** ondansetron  •  ondansetron  •  potassium chloride  •  [COMPLETED] Insert peripheral IV " **AND** sodium chloride  •  sodium chloride        Results Review:     I reviewed the patient's new clinical results.    CBC    Results from last 7 days   Lab Units 06/23/20  1732   WBC 10*3/mm3 12.00*   HEMOGLOBIN g/dL 13.5   PLATELETS 10*3/mm3 358     Cr Clearance Estimated Creatinine Clearance: 51.9 mL/min (A) (by C-G formula based on SCr of 1.11 mg/dL (H)).  Coag     HbA1C No results found for: HGBA1C  Blood Glucose No results found for: POCGLU  Infection     CMP Results from last 7 days   Lab Units 06/23/20  1732   SODIUM mmol/L 133*   POTASSIUM mmol/L 3.7   CHLORIDE mmol/L 98   CO2 mmol/L 23.0   BUN  35*   CREATININE mg/dL 1.11*   GLUCOSE mg/dL 146*   ALBUMIN g/dL 4.20   BILIRUBIN mg/dL 0.4   ALK PHOS U/L 126*   AST (SGOT) U/L 16   ALT (SGPT) U/L 13   LIPASE U/L 74*     ABG      UA  Results from last 7 days   Lab Units 06/23/20  1747   NITRITE UA  Negative   WBC UA /HPF 0-2*   BACTERIA UA /HPF None Seen   SQUAM EPITHEL UA /HPF None Seen     TARIQ  No results found for: POCMETH, POCAMPHET, POCBARBITUR, POCBENZO, POCCOCAINE, POCOPIATES, POCOXYCODO, POCPHENCYC, POCPROPOXY, POCTHC, POCTRICYC  Lysis Labs Results from last 7 days   Lab Units 06/23/20  1732   HEMOGLOBIN g/dL 13.5   PLATELETS 10*3/mm3 358   CREATININE mg/dL 1.11*     Radiology(recent) Xr Chest 1 View    Result Date: 6/23/2020  Mild pulmonary edema  Electronically Signed By-Christ Dias On:6/23/2020 5:25 PM This report was finalized on [id number] by  Christ Dias, .        Results from last 7 days   Lab Units 06/24/20  0044   TROPONIN T ng/mL <0.010       Xrays, labs reviewed personally by physician.    ECG/EMG Results (most recent)     None            Medication Review:   I have reviewed the patient's current medication list  Scheduled Meds:  amLODIPine 5 mg Oral Daily   aspirin 81 mg Oral Daily   atorvastatin 10 mg Oral Daily   carvedilol 12.5 mg Oral BID With Meals   enoxaparin 1 mg/kg Subcutaneous Q12H   hydroCHLOROthiazide 12.5 mg Oral Daily    losartan 100 mg Oral Daily   sodium chloride 10 mL Intravenous Q12H     Continuous Infusions:   PRN Meds:.•  acetaminophen **OR** acetaminophen **OR** acetaminophen  •  ALPRAZolam  •  aluminum-magnesium hydroxide-simethicone  •  bisacodyl  •  magnesium hydroxide  •  magnesium sulfate **OR** magnesium sulfate in D5W 1g/100mL (PREMIX)  •  melatonin  •  methocarbamol  •  nitroglycerin  •  ondansetron **OR** ondansetron  •  ondansetron  •  potassium chloride  •  [COMPLETED] Insert peripheral IV **AND** sodium chloride  •  sodium chloride    Imaging:  Imaging Results (Last 72 Hours)     Procedure Component Value Units Date/Time    XR Chest 1 View [[id number]] Collected:  06/23/20 1724     Updated:  06/23/20 1727    Narrative:       Examination: XR CHEST 1 VW-     Date of Exam: 6/23/2020 5:10 PM     Indication: chest pain.     Comparison: None available.     Technique: 1 view of the chest      Findings:  The heart size is normal. There is prominence of the bilateral pulmonary  vascular markings consistent with mild pulmonary edema. There are no  focal infiltrates. There is slight elevation right hemidiaphragm.       Impression:       Mild pulmonary edema     Electronically Signed By-Christ Dias On:6/23/2020 5:25 PM  This report was finalized on [id number] by  Christ Dias, .

## 2020-06-24 NOTE — PROGRESS NOTES
[facility] Medicine Services Daily Progress Note      Hospitalist Team  LOS 0 days      Patient Care Team:  Abhay Benavides MD as PCP - General (Internal Medicine)  Cecilia Caputo MD as Consulting Physician (Cardiology)    Patient Location: 205/1      Subjective   Subjective     Chief Complaint / Subjective  Chief Complaint   Patient presents with   • Rapid Heart Rate     onset this afternoon.         Brief Synopsis of Hospital Course/HPI      The patient is a  79 y.o. female  with history of anxiety, hypertension, hyperlipidemia, PAD that presented to the ED on 6/23/2020 complaining of palpitations.  The patient also claims to have had brief episode of syncope that was preceded by dizziness about 4 weeks ago when she was going up the steps.  Since then, she has been having generalized fatigue and was treated with Macrobid for UTI at [place] ED.  She continued to have generalized fatigue and intermittent nausea and lack of appetite despite taking Macrobid.  She stopped taking Lexapro 2 weeks ago thinking that it was causing some of her symptoms.  Her  has been having diarrhea for about 4 weeks but she has been constipated.      She denied fevers, chills, sweats, orthopnea, PND, nausea, vomiting or abdominal pain.  The patient has been has been having diarrhea for about 4 weeks.     Date::    6/24/20: Examined in the a.m.  The patient has been taking care of her  for 4 weeks and he has been having diarrhea.  She claims to have 4 weeks of fatigue despite being treated with Macrobid for UTI several weeks ago.  Claims to have had syncope about 4 weeks ago with BP 90/50.  Ordered COVID swab.  Cardiology consulted.      Review of Systems   All other systems reviewed and are negative.        Objective   Objective      Vital Signs  Temp:  [97.5 °F (36.4 °C)-98.5 °F (36.9 °C)] 97.5 °F (36.4 °C)  Heart Rate:  [50-92] 50  Resp:  [11-17] 11  BP: (104-194)/(58-89)  "136/58  Oxygen Therapy  SpO2: 99 %  Pulse Oximetry Type: Intermittent  Device (Oxygen Therapy): room air  Flowsheet Rows      First Filed Value   Admission Height  172.7 cm (68\") Documented at 06/23/2020 1656   Admission Weight  95.8 kg (211 lb 3 oz) Documented at 06/23/2020 1656        Intake & Output (last 3 days)       06/21 0701 - 06/22 0700 06/22 0701 - 06/23 0700 06/23 0701 - 06/24 0700 06/24 0701 - 06/25 0700    P.O.    0    IV Piggyback   500     Total Intake(mL/kg)   500 (5.1) 0 (0)    Net   +500 0            Urine Unmeasured Occurrence   2 x         Lines, Drains & Airways    Active LDAs     Name:   Placement date:   Placement time:   Site:   Days:    Peripheral IV 06/23/20 1935 Left Antecubital   06/23/20 1935    Antecubital   less than 1                  Physical Exam:    Physical Exam   Constitutional: She is oriented to person, place, and time. She appears well-developed and well-nourished.   HENT:   Head: Normocephalic and atraumatic.   Eyes: Pupils are equal, round, and reactive to light. EOM are normal.   Neck: Normal range of motion. Neck supple.   Cardiovascular: Normal rate and regular rhythm.   Pulmonary/Chest: Effort normal and breath sounds normal.   Abdominal: Soft. Bowel sounds are normal.   Musculoskeletal: Normal range of motion.   Neurological: She is alert and oriented to person, place, and time.   Skin: Skin is warm and dry.   Psychiatric: She has a normal mood and affect.             Procedures:              Results Review:     I reviewed the patient's new clinical results.      Lab Results (last 24 hours)     Procedure Component Value Units Date/Time    Basic Metabolic Panel [[id number]]  (Abnormal) Collected:  06/24/20 1219    Specimen:  Blood Updated:  06/24/20 1314     Glucose 110 mg/dL      BUN --     Comment: Testing performed by alternate method        Creatinine 1.11 mg/dL      Sodium 138 mmol/L      Potassium 3.9 mmol/L      Chloride 101 mmol/L      CO2 27.0 mmol/L      " Calcium 8.9 mg/dL      eGFR Non African Amer 48 mL/min/1.73      BUN/Creatinine Ratio --     Comment: Testing not performed        Anion Gap 10.0 mmol/L     Narrative:       GFR Normal >60  Chronic Kidney Disease <60  Kidney Failure <15      Troponin [[id number]]  (Normal) Collected:  06/24/20 1219    Specimen:  Blood Updated:  06/24/20 1314     Troponin T <0.010 ng/mL     Narrative:       Troponin T Reference Range:  <= 0.03 ng/mL-   Negative for AMI  >0.03 ng/mL-     Abnormal for myocardial necrosis.  Clinicians would have to utilize clinical acumen, EKG, Troponin and serial changes to determine if it is an Acute Myocardial Infarction or myocardial injury due to an underlying chronic condition.       Results may be falsely decreased if patient taking Biotin.      BUN [[id number]] Collected:  06/24/20 1219    Specimen:  Blood Updated:  06/24/20 1312    COVID-19,CEPHEID,COR/DAVID/PAD IN-HOUSE(OR EMERGENT/ADD-ON),NP SWAB IN TRANSPORT MEDIA 3-4 HR TAT - Swab, Nasopharynx [[id number]]  (Normal) Collected:  06/24/20 1017    Specimen:  Swab from Nasopharynx Updated:  06/24/20 1121     COVID19 Not Detected    Narrative:       Fact sheet for providers: https://www.fda.gov/media/875238/download     Fact sheet for patients: https://www.fda.gov/media/032958/download    Magnesium [[id number]]  (Normal) Collected:  06/24/20 0832    Specimen:  Blood Updated:  06/24/20 1009     Magnesium 2.0 mg/dL     CBC Auto Differential [[id number]]  (Abnormal) Collected:  06/24/20 0833    Specimen:  Blood Updated:  06/24/20 0945     WBC 14.50 10*3/mm3      RBC 4.17 10*6/mm3      Hemoglobin 14.0 g/dL      Hematocrit 40.1 %      MCV 96.1 fL      MCH 33.6 pg      MCHC 34.9 g/dL      RDW 17.1 %      RDW-SD 56.4 fl      MPV 8.1 fL      Platelets 345 10*3/mm3      Neutrophil % 64.7 %      Lymphocyte % 17.9 %      Monocyte % 15.0 %      Eosinophil % 1.9 %      Basophil % 0.5 %      Neutrophils, Absolute 9.30 10*3/mm3      Lymphocytes, Absolute 2.60  10*3/mm3      Monocytes, Absolute 2.20 10*3/mm3      Eosinophils, Absolute 0.30 10*3/mm3      Basophils, Absolute 0.10 10*3/mm3      nRBC 0.2 /100 WBC     Troponin [[id number]]  (Normal) Collected:  06/24/20 0044    Specimen:  Blood Updated:  06/24/20 0147     Troponin T <0.010 ng/mL     Narrative:       Troponin T Reference Range:  <= 0.03 ng/mL-   Negative for AMI  >0.03 ng/mL-     Abnormal for myocardial necrosis.  Clinicians would have to utilize clinical acumen, EKG, Troponin and serial changes to determine if it is an Acute Myocardial Infarction or myocardial injury due to an underlying chronic condition.       Results may be falsely decreased if patient taking Biotin.      TSH [[id number]]  (Normal) Collected:  06/23/20 1732    Specimen:  Blood Updated:  06/23/20 2131     TSH 1.240 uIU/mL     BUN [[id number]]  (Abnormal) Collected:  06/23/20 1732    Specimen:  Blood Updated:  06/23/20 1817     BUN 35 mg/dL     Comprehensive Metabolic Panel [[id number]]  (Abnormal) Collected:  06/23/20 1732    Specimen:  Blood Updated:  06/23/20 1817     Glucose 146 mg/dL      BUN --     Comment: Testing performed by alternate method        Creatinine 1.11 mg/dL      Sodium 133 mmol/L      Potassium 3.7 mmol/L      Chloride 98 mmol/L      CO2 23.0 mmol/L      Calcium 9.3 mg/dL      Total Protein 7.6 g/dL      Albumin 4.20 g/dL      ALT (SGPT) 13 U/L      AST (SGOT) 16 U/L      Alkaline Phosphatase 126 U/L      Total Bilirubin 0.4 mg/dL      eGFR Non African Amer 48 mL/min/1.73      Globulin 3.4 gm/dL      A/G Ratio 1.2 g/dL      BUN/Creatinine Ratio --     Comment: Testing not performed        Anion Gap 12.0 mmol/L     Narrative:       GFR Normal >60  Chronic Kidney Disease <60  Kidney Failure <15      Lipase [[id number]]  (Abnormal) Collected:  06/23/20 1732    Specimen:  Blood Updated:  06/23/20 1817     Lipase 74 U/L     Troponin [[id number]]  (Normal) Collected:  06/23/20 1732    Specimen:  Blood Updated:  06/23/20 1817      Troponin T <0.010 ng/mL     Narrative:       Troponin T Reference Range:  <= 0.03 ng/mL-   Negative for AMI  >0.03 ng/mL-     Abnormal for myocardial necrosis.  Clinicians would have to utilize clinical acumen, EKG, Troponin and serial changes to determine if it is an Acute Myocardial Infarction or myocardial injury due to an underlying chronic condition.       Results may be falsely decreased if patient taking Biotin.      BNP [[id number]]  (Normal) Collected:  06/23/20 1732    Specimen:  Blood Updated:  06/23/20 1813     proBNP 285.8 pg/mL     Narrative:       Among patients with dyspnea, NT-proBNP is highly sensitive for the detection of acute congestive heart failure. In addition NT-proBNP of <300 pg/ml effectively rules out acute congestive heart failure with 99% negative predictive value.    Results may be falsely decreased if patient taking Biotin.      Urinalysis, Microscopic Only - Urine, Catheter [[id number]]  (Abnormal) Collected:  06/23/20 1747    Specimen:  Urine, Catheter Updated:  06/23/20 1805     RBC, UA 3-5 /HPF      WBC, UA 0-2 /HPF      Bacteria, UA None Seen /HPF      Squamous Epithelial Cells, UA None Seen /HPF      Hyaline Casts, UA None Seen /LPF      Methodology Automated Microscopy    Urinalysis With Culture If Indicated - Urine, Catheter [[id number]]  (Abnormal) Collected:  06/23/20 1747    Specimen:  Urine, Catheter Updated:  06/23/20 1805     Color, UA Yellow     Appearance, UA Clear     pH, UA 6.5     Specific Gravity, UA 1.007     Glucose, UA Negative     Ketones, UA Negative     Bilirubin, UA Negative     Blood, UA Trace     Protein, UA Negative     Leuk Esterase, UA Negative     Nitrite, UA Negative     Urobilinogen, UA 0.2 E.U./dL    CBC & Differential [[id number]] Collected:  06/23/20 1732    Specimen:  Blood Updated:  06/23/20 1737    Narrative:       The following orders were created for panel order CBC & Differential.  Procedure                               Abnormality          Status                     ---------                               -----------         ------                     CBC Auto Differential[[id number]]        Abnormal            Final result                 Please view results for these tests on the individual orders.    CBC Auto Differential [[id number]]  (Abnormal) Collected:  06/23/20 1732    Specimen:  Blood Updated:  06/23/20 1737     WBC 12.00 10*3/mm3      RBC 3.99 10*6/mm3      Hemoglobin 13.5 g/dL      Hematocrit 38.4 %      MCV 96.4 fL      MCH 33.8 pg      MCHC 35.0 g/dL      RDW 17.0 %      RDW-SD 56.0 fl      MPV 6.9 fL      Platelets 358 10*3/mm3      Neutrophil % 80.6 %      Lymphocyte % 8.6 %      Monocyte % 10.2 %      Eosinophil % 0.3 %      Basophil % 0.3 %      Neutrophils, Absolute 9.70 10*3/mm3      Lymphocytes, Absolute 1.00 10*3/mm3      Monocytes, Absolute 1.20 10*3/mm3      Eosinophils, Absolute 0.00 10*3/mm3      Basophils, Absolute 0.00 10*3/mm3      nRBC 0.1 /100 WBC         No results found for: HGBA1C            Lab Results   Component Value Date    LIPASE 74 (H) 06/23/2020     Lab Results   Component Value Date    CHLPL 164 03/01/2019    TRIG 78 03/01/2019    HDL 58 03/01/2019    LDL 90 03/01/2019       No results found for: INTRAOP, PREDX, FINALDX, COMDX    Microbiology Results (last 10 days)     Procedure Component Value - Date/Time    COVID-19,CEPHEID,COR/DAVID/PAD IN-HOUSE(OR EMERGENT/ADD-ON),NP SWAB IN TRANSPORT MEDIA 3-4 HR TAT - Swab, Nasopharynx [[id number]]  (Normal) Collected:  06/24/20 1017    Lab Status:  Final result Specimen:  Swab from Nasopharynx Updated:  06/24/20 1121     COVID19 Not Detected    Narrative:       Fact sheet for providers: https://www.fda.gov/media/422918/download     Fact sheet for patients: https://www.fda.gov/media/459483/download          ECG/EMG Results (most recent)     None                    Xr Chest 1 View    Result Date: 6/23/2020  Mild pulmonary edema  Electronically Signed By-Christ Dias  On:6/23/2020 5:25 PM This report was finalized on [id number] by  Christ Dias, .          Xrays, labs reviewed personally by physician.    Medication Review:   I have reviewed the patient's current medication list      Scheduled Meds    amLODIPine 5 mg Oral Daily   aspirin 81 mg Oral Daily   atorvastatin 10 mg Oral Daily   carvedilol 12.5 mg Oral BID With Meals   enoxaparin 1 mg/kg Subcutaneous Q12H   hydroCHLOROthiazide 12.5 mg Oral Daily   losartan 100 mg Oral Daily   sodium chloride 10 mL Intravenous Q12H       Meds Infusions       Meds PRN  •  acetaminophen **OR** acetaminophen **OR** acetaminophen  •  ALPRAZolam  •  aluminum-magnesium hydroxide-simethicone  •  bisacodyl  •  magnesium hydroxide  •  magnesium sulfate **OR** magnesium sulfate in D5W 1g/100mL (PREMIX)  •  melatonin  •  methocarbamol  •  nitroglycerin  •  ondansetron **OR** ondansetron  •  ondansetron  •  potassium chloride  •  [COMPLETED] Insert peripheral IV **AND** sodium chloride  •  sodium chloride      Assessment/Plan   Assessment/Plan     Active Hospital Problems    Diagnosis  POA   • **Atrial fibrillation (CMS/HCC) [I48.91]  Yes   • Muscle spasm [M62.838]  Yes   • Obesity (BMI 30-39.9) [E66.9]  Yes   • Chronic renal insufficiency, stage III (moderate) (CMS/HCC) [N18.3]  Yes   • Anxiety disorder [F41.9]  Yes   • Celiac disease [K90.0]  Yes   • Dyslipidemia [E78.5]  Yes   • Essential hypertension [I10]  Yes      Resolved Hospital Problems   No resolved problems to display.       MEDICAL DECISION MAKING COMPLEXITY BY PROBLEM:       Fatigue and palpitations:  -Concern for arrhythmia  -Cardiology consulted  -Check TSH  -COVID-19 swab ordered      Essential hypertension:  -Continue home Norvasc, Coreg, hydrochlorothiazide, Cozaar     Muscle spasms  -Continue home Robaxin     Hyperlipidemia  -Continue home statin     CKD stage III:  -At baseline  -Nephrotoxin holds     Anxiety:  -Stopped Lexapro 2 weeks ago  -Continue home Xanax     Celiac  disease:  -Gluten-free diet     History of seizure disorder  -Not on home medication for this  -Has not had seizures for several years.         VTE Prophylaxis -   Mechanical Order History:     None      Pharmalogical Order History:     Ordered     Dose Route Frequency Stop    06/23/20 2052  enoxaparin (LOVENOX) syringe 100 mg      1 mg/kg SC Every 12 Hours --            Code Status -   Code Status and Medical Interventions:   Ordered at: 06/23/20 2010     Code Status:    CPR     Medical Interventions (Level of Support Prior to Arrest):    Full       This patient has been examined wearing appropriate Personal Protective Equipment 06/24/20        Discharge Planning          Destination      Coordination has not been started for this encounter.      Durable Medical Equipment      Coordination has not been started for this encounter.      Dialysis/Infusion      Coordination has not been started for this encounter.      Home Medical Care      Coordination has not been started for this encounter.      Therapy      Coordination has not been started for this encounter.      Community Resources      Coordination has not been started for this encounter.            Electronically signed by Jero Alfaro DO, 06/24/20, 13:16.  [facility] Hospitalist Team

## 2020-06-25 VITALS
HEIGHT: 68 IN | TEMPERATURE: 97.6 F | WEIGHT: 215 LBS | DIASTOLIC BLOOD PRESSURE: 71 MMHG | RESPIRATION RATE: 18 BRPM | SYSTOLIC BLOOD PRESSURE: 103 MMHG | OXYGEN SATURATION: 96 % | HEART RATE: 59 BPM | BODY MASS INDEX: 32.58 KG/M2

## 2020-06-25 PROBLEM — I48.91 ATRIAL FIBRILLATION: Status: RESOLVED | Noted: 2020-06-23 | Resolved: 2020-06-25

## 2020-06-25 LAB
ANION GAP SERPL CALCULATED.3IONS-SCNC: 11 MMOL/L (ref 5–15)
BASOPHILS # BLD AUTO: 0.1 10*3/MM3 (ref 0–0.2)
BASOPHILS NFR BLD AUTO: 0.7 % (ref 0–1.5)
BH CV NUCLEAR PRIOR STUDY: 2
BH CV STRESS BP STAGE 1: NORMAL
BH CV STRESS BP STAGE 2: NORMAL
BH CV STRESS COMMENTS STAGE 1: NORMAL
BH CV STRESS COMMENTS STAGE 2: NORMAL
BH CV STRESS DOSE REGADENOSON STAGE 1: 0.4
BH CV STRESS DURATION MIN STAGE 1: 0
BH CV STRESS DURATION MIN STAGE 2: 4
BH CV STRESS DURATION SEC STAGE 1: 10
BH CV STRESS DURATION SEC STAGE 2: 0
BH CV STRESS HR STAGE 1: 53
BH CV STRESS HR STAGE 2: 80
BH CV STRESS PROTOCOL 1: NORMAL
BH CV STRESS RECOVERY BP: NORMAL MMHG
BH CV STRESS RECOVERY HR: 69 BPM
BH CV STRESS STAGE 1: 1
BH CV STRESS STAGE 2: 2
BUN BLD-MCNC: 32 MG/DL (ref 8–23)
BUN BLD-MCNC: ABNORMAL MG/DL
BUN/CREAT SERPL: ABNORMAL
CALCIUM SPEC-SCNC: 8.8 MG/DL (ref 8.6–10.5)
CHLORIDE SERPL-SCNC: 102 MMOL/L (ref 98–107)
CO2 SERPL-SCNC: 24 MMOL/L (ref 22–29)
CREAT BLD-MCNC: 1.1 MG/DL (ref 0.57–1)
DEPRECATED RDW RBC AUTO: 58.2 FL (ref 37–54)
EOSINOPHIL # BLD AUTO: 0.4 10*3/MM3 (ref 0–0.4)
EOSINOPHIL NFR BLD AUTO: 3.3 % (ref 0.3–6.2)
ERYTHROCYTE [DISTWIDTH] IN BLOOD BY AUTOMATED COUNT: 17.3 % (ref 12.3–15.4)
GFR SERPL CREATININE-BSD FRML MDRD: 48 ML/MIN/1.73
GLUCOSE BLD-MCNC: 92 MG/DL (ref 65–99)
HCT VFR BLD AUTO: 38.2 % (ref 34–46.6)
HGB BLD-MCNC: 13.1 G/DL (ref 12–15.9)
LV EF NUC BP: 76 %
LYMPHOCYTES # BLD AUTO: 3.6 10*3/MM3 (ref 0.7–3.1)
LYMPHOCYTES NFR BLD AUTO: 26.8 % (ref 19.6–45.3)
MAGNESIUM SERPL-MCNC: 2.3 MG/DL (ref 1.6–2.4)
MAXIMAL PREDICTED HEART RATE: 143 BPM
MCH RBC QN AUTO: 33.2 PG (ref 26.6–33)
MCHC RBC AUTO-ENTMCNC: 34.2 G/DL (ref 31.5–35.7)
MCV RBC AUTO: 97 FL (ref 79–97)
MONOCYTES # BLD AUTO: 1.7 10*3/MM3 (ref 0.1–0.9)
MONOCYTES NFR BLD AUTO: 12.7 % (ref 5–12)
NEUTROPHILS # BLD AUTO: 7.6 10*3/MM3 (ref 1.7–7)
NEUTROPHILS NFR BLD AUTO: 56.5 % (ref 42.7–76)
NRBC BLD AUTO-RTO: 0.1 /100 WBC (ref 0–0.2)
PERCENT MAX PREDICTED HR: 55.94 %
PLATELET # BLD AUTO: 329 10*3/MM3 (ref 140–450)
PMV BLD AUTO: 7.4 FL (ref 6–12)
POTASSIUM BLD-SCNC: 3.9 MMOL/L (ref 3.5–5.2)
RBC # BLD AUTO: 3.94 10*6/MM3 (ref 3.77–5.28)
SODIUM BLD-SCNC: 137 MMOL/L (ref 136–145)
STRESS BASELINE BP: NORMAL MMHG
STRESS BASELINE HR: 53 BPM
STRESS PERCENT HR: 66 %
STRESS POST PEAK BP: NORMAL MMHG
STRESS POST PEAK HR: 80 BPM
STRESS TARGET HR: 122 BPM
T4 FREE SERPL-MCNC: 1.63 NG/DL (ref 0.93–1.7)
WBC NRBC COR # BLD: 13.5 10*3/MM3 (ref 3.4–10.8)

## 2020-06-25 PROCEDURE — 80048 BASIC METABOLIC PNL TOTAL CA: CPT | Performed by: HOSPITALIST

## 2020-06-25 PROCEDURE — G0378 HOSPITAL OBSERVATION PER HR: HCPCS

## 2020-06-25 PROCEDURE — 83735 ASSAY OF MAGNESIUM: CPT | Performed by: PHYSICIAN ASSISTANT

## 2020-06-25 PROCEDURE — 84439 ASSAY OF FREE THYROXINE: CPT | Performed by: HOSPITALIST

## 2020-06-25 PROCEDURE — 85025 COMPLETE CBC W/AUTO DIFF WBC: CPT | Performed by: HOSPITALIST

## 2020-06-25 PROCEDURE — 99217 PR OBSERVATION CARE DISCHARGE MANAGEMENT: CPT | Performed by: HOSPITALIST

## 2020-06-25 PROCEDURE — 99214 OFFICE O/P EST MOD 30 MIN: CPT | Performed by: INTERNAL MEDICINE

## 2020-06-25 RX ADMIN — LOSARTAN POTASSIUM 100 MG: 50 TABLET, FILM COATED ORAL at 09:06

## 2020-06-25 RX ADMIN — Medication 10 ML: at 09:06

## 2020-06-25 RX ADMIN — METOPROLOL TARTRATE 25 MG: 25 TABLET, FILM COATED ORAL at 09:07

## 2020-06-25 RX ADMIN — HYDROCHLOROTHIAZIDE 12.5 MG: 12.5 TABLET ORAL at 09:06

## 2020-06-25 RX ADMIN — APIXABAN 5 MG: 5 TABLET, FILM COATED ORAL at 09:07

## 2020-06-25 NOTE — PROGRESS NOTES
Continued Stay Note  LIANET Obrien     Patient Name: Susan Garcia  MRN: [id number]  Today's Date: 6/25/2020    Admit Date: 6/23/2020    Discharge Plan     Row Name 06/25/20 0204       Plan    Plan  DC Plan: Return home- Pt called her insurance and she will be placed on program for Xarelto, cost at $5.00, will take 72 hrs to implement. Home w/30 day free coupon for Xarelto.    Row Name 06/25/20 1020       Plan    Plan  Eliquis is not preferred per insurance. [employer]YA WRIGHT approved Xarelto. Xarelto 20 mg qd w/dinner ([facility] for dose) called to [facility].  Pending copay.                     Ashlyn Gagnon RN

## 2020-06-25 NOTE — PROGRESS NOTES
Continued Stay Note   Micah     Patient Name: Susan Garcia  MRN: [id number]  Today's Date: 6/25/2020    Admit Date: 6/23/2020    Discharge Plan     Row Name 06/25/20 1020       Plan    Plan  Eliquis is not preferred per insurance. [employer]YA WRIGHT approved Xarelto. Xarelto 20 mg qd w/dinner ([facility] for dose) called to [facility].  Pending copay.                     Ashlyn Gagnon RN

## 2020-06-25 NOTE — NURSING NOTE
Spoke with patient before discharging about order for Holter monitor.  Patient stated that her PCP already ordered one and that she will follow up

## 2020-06-25 NOTE — PROGRESS NOTES
Cardiology Progress  Note      Patient Care Team:  Abhay Benavides MD as PCP - General (Internal Medicine)  Cecilia Caputo MD as Consulting Physician (Cardiology)    PATIENT IDENTIFICATION  Name: Susan Garcia  Age: 79 y.o.  Sex: female  :  1940  MRN: [id number]             REASON FOR FOLLOW-UP:  Paroxysmal atrial fibrillation-new problem  Elevated chads vascular score        SUBJECTIVE    Patient denies any chest discomfort, shortness of breath  States she does feel weak and tired      REVIEW OF SYSTEMS:  Pertinent items are noted in HPI, all other systems reviewed and negative    OBJECTIVE   Sitting on side of bed and appears in no distress    ASSESSMENT/PLAN    Atrial fibrillation (CMS/HCC)    Celiac disease    Chronic renal insufficiency, stage III (moderate) (CMS/HCC)    Dyslipidemia    Essential hypertension    Anxiety disorder    Muscle spasm    Obesity (BMI 30-39.9)  Newly diagnosed atrial fibrillation currently in sinus rhythm  Paroxysmal atrial fibrillation currently in sinus rhythm    Discharge patient home on a beta-blocker and anticoagulation therapy  Need for close monitoring and follow-up discussed with patient  Asked patient to call the office for any further symptomatic palpitations for possible Holter or event monitor as an outpatient  Follow-up in office in 1 to 2 weeks      Plan:  Only one EKGs available from yesterday rest of the EKGs that were done in the emergency room and telemetry strips are not available for review unable to get them in the system  Patient is currently in sinus rhythm  History of hypertension  Elevated Justo vascular score.  Started on anticoagulation for stroke prophylaxis  Holter monitor for 48 hours at discharge  Continue metoprolol  History of syncope few weeks back in the setting of UTI and dehydration  No exertional symptoms of chest pain  Stress test with fixed perfusion defect likely attenuation artifact  Echocardiogram with  "normal LV systolic function  Okay to discharge from cardiac standpoint.  Follow-up in office 1-2 weeks        Vital Signs  Visit Vitals  BP 98/66 (BP Location: Right arm, Patient Position: Lying)   Pulse 63   Temp 97.9 °F (36.6 °C) (Oral)   Resp 15   Ht 172.7 cm (68\")   Wt 97.5 kg (215 lb)   SpO2 96%   BMI 32.69 kg/m²     Oxygen Therapy  SpO2: 96 %  Pulse Oximetry Type: Intermittent  Device (Oxygen Therapy): room air  Flowsheet Rows      First Filed Value   Admission Height  172.7 cm (68\") Documented at 06/23/2020 1656   Admission Weight  95.8 kg (211 lb 3 oz) Documented at 06/23/2020 1656        Intake & Output (last 3 days)       06/22 0701 - 06/23 0700 06/23 0701 - 06/24 0700 06/24 0701 - 06/25 0700 06/25 0701 - 06/26 0700    P.O.   240     IV Piggyback  500      Total Intake(mL/kg)  500 (5.1) 240 (2.5)     Net  +500 +240             Urine Unmeasured Occurrence  2 x 1 x         Lines, Drains & Airways    Active LDAs     Name:   Placement date:   Placement time:   Site:   Days:    Peripheral IV 06/23/20 1935 Left Antecubital   06/23/20 1935    Antecubital   1                       BP 98/66 (BP Location: Right arm, Patient Position: Lying)   Pulse 63   Temp 97.9 °F (36.6 °C) (Oral)   Resp 15   Ht 172.7 cm (68\")   Wt 97.5 kg (215 lb)   SpO2 96%   BMI 32.69 kg/m²   Intake/Output last 3 shifts:  I/O last 3 completed shifts:  In: 740 [P.O.:240; IV Piggyback:500]  Out: -   Intake/Output this shift:  No intake/output data recorded.    PHYSICAL EXAM:    General: Alert, cooperative, no distress, appears stated age  Head:  Normocephalic, atraumatic, mucous membranes moist  Eyes:  Conjunctiva/corneas clear, EOM's intact     Neck:  Supple,  no adenopathy;      Lungs: Clear to auscultation bilaterally, no wheezes rhonchi rales are noted  Chest wall: No tenderness  Heart::  Regular rate and rhythm, S1 and S2 normal, no murmur, rub or gallop  Abdomen: Soft, non-tender, nondistended bowel sounds active  Extremities: No " cyanosis, clubbing, or edema   Pulses: 2+ and symmetric all extremities  Skin:  No rashes or lesions  Neuro/psych: A&O x3. CN II through XII are grossly intact with appropriate affect      Scheduled Meds:        amLODIPine 5 mg Oral Daily   apixaban 5 mg Oral Q12H   atorvastatin 10 mg Oral Daily   hydroCHLOROthiazide 12.5 mg Oral Daily   losartan 100 mg Oral Daily   metoprolol tartrate 25 mg Oral Q12H   sodium chloride 10 mL Intravenous Q12H       Continuous Infusions:         PRN Meds:    •  acetaminophen **OR** acetaminophen **OR** acetaminophen  •  ALPRAZolam  •  aluminum-magnesium hydroxide-simethicone  •  bisacodyl  •  magnesium hydroxide  •  magnesium sulfate **OR** magnesium sulfate in D5W 1g/100mL (PREMIX)  •  melatonin  •  methocarbamol  •  nitroglycerin  •  ondansetron **OR** ondansetron  •  ondansetron  •  potassium chloride  •  [COMPLETED] Insert peripheral IV **AND** sodium chloride  •  sodium chloride        Results Review:     I reviewed the patient's new clinical results.    CBC    Results from last 7 days   Lab Units 06/25/20  0443 06/24/20  0833 06/23/20  1732   WBC 10*3/mm3 13.50* 14.50* 12.00*   HEMOGLOBIN g/dL 13.1 14.0 13.5   PLATELETS 10*3/mm3 329 345 358     Cr Clearance Estimated Creatinine Clearance: 52.3 mL/min (A) (by C-G formula based on SCr of 1.1 mg/dL (H)).  Coag     HbA1C No results found for: HGBA1C  Blood Glucose No results found for: POCGLU  Infection     CMP Results from last 7 days   Lab Units 06/25/20  0443 06/24/20  1219 06/23/20  1732   SODIUM mmol/L 137 138 133*   POTASSIUM mmol/L 3.9 3.9 3.7   CHLORIDE mmol/L 102 101 98   CO2 mmol/L 24.0 27.0 23.0   BUN   --  28* 35*   CREATININE mg/dL 1.10* 1.11* 1.11*   GLUCOSE mg/dL 92 110* 146*   ALBUMIN g/dL  --   --  4.20   BILIRUBIN mg/dL  --   --  0.4   ALK PHOS U/L  --   --  126*   AST (SGOT) U/L  --   --  16   ALT (SGPT) U/L  --   --  13   LIPASE U/L  --   --  74*     ABG      UA  Results from last 7 days   Lab Units  06/23/20  1747   NITRITE UA  Negative   WBC UA /HPF 0-2*   BACTERIA UA /HPF None Seen   SQUAM EPITHEL UA /HPF None Seen     TARIQ  No results found for: POCMETH, POCAMPHET, POCBARBITUR, POCBENZO, POCCOCAINE, POCOPIATES, POCOXYCODO, POCPHENCYC, POCPROPOXY, POCTHC, POCTRICYC  Lysis Labs Results from last 7 days   Lab Units 06/25/20  0443 06/24/20  1219 06/24/20  0833 06/23/20  1732   HEMOGLOBIN g/dL 13.1  --  14.0 13.5   PLATELETS 10*3/mm3 329  --  345 358   CREATININE mg/dL 1.10* 1.11*  --  1.11*     Radiology(recent) Xr Chest 1 View    Result Date: 6/23/2020  Mild pulmonary edema  Electronically Signed By-Christ Dias On:6/23/2020 5:25 PM This report was finalized on [id number] by  Christ Dias, .        Results from last 7 days   Lab Units 06/24/20  1219   TROPONIN T ng/mL <0.010       Xrays, labs reviewed personally by physician.    ECG/EMG Results (most recent)     Procedure Component Value Units Date/Time    Adult Transthoracic Echo Complete W/ Cont if Necessary Per Protocol [[id number]] Collected:  06/24/20 1453     Updated:  06/24/20 1758     BSA 2.1 m^2      RVIDd 2.9 cm      IVSd 1.3 cm      IVSs 1.7 cm      LVIDd 5.1 cm      LVIDs 2.5 cm      LVPWd 1.1 cm      BH CV ECHO TRESA - LVPWS 1.7 cm      IVS/LVPW 1.2     FS 50.8 %      EDV(Teich) 121.7 ml      ESV(Teich) 22.1 ml      EF(Teich) 81.8 %      EDV(cubed) 129.8 ml      ESV(cubed) 15.4 ml      EF(cubed) 88.1 %      % IVS thick 28.8 %      % LVPW thick 51.6 %      LV mass(C)d 245.4 grams      LV mass(C)dI 116.5 grams/m^2      LV mass(C)s 159.0 grams      LV mass(C)sI 75.4 grams/m^2      SV(Teich) 99.6 ml      SI(Teich) 47.3 ml/m^2      SV(cubed) 114.3 ml      SI(cubed) 54.2 ml/m^2      Ao root diam 3.0 cm      Ao root area 7.2 cm^2      ACS 1.9 cm      LVOT diam 1.9 cm      LVOT area 2.7 cm^2      EDV(MOD-sp4) 56.2 ml      ESV(MOD-sp4) 18.2 ml      EF(MOD-sp4) 67.6 %      SV(MOD-sp4) 38.0 ml      SI(MOD-sp4) 18.0 ml/m^2      Ao root area (BSA corrected) 1.4      LV Schroeder Vol (BSA corrected) 26.7 ml/m^2      LV Sys Vol (BSA corrected) 8.6 ml/m^2      MV E max misael 59.2 cm/sec      MV A max misael 120.2 cm/sec      MV E/A 0.49     MV V2 max 119.0 cm/sec      MV max PG 5.7 mmHg      MV V2 mean 58.8 cm/sec      MV mean PG 1.7 mmHg      MV V2 VTI 33.6 cm      MVA(VTI) 2.5 cm^2      MV dec slope 184.6 cm/sec^2      MV dec time 0.32 sec      Ao pk misael 120.7 cm/sec      Ao max PG 5.8 mmHg      Ao max PG (full) -1.2 mmHg      Ao V2 mean 93.3 cm/sec      Ao mean PG 3.7 mmHg      Ao mean PG (full) 0.0 mmHg      Ao V2 VTI 28.1 cm      YVROSE(I,A) 3.0 cm^2      YVROSE(I,D) 3.0 cm^2      YVROSE(V,A) 3.0 cm^2      YVROSE(V,D) 3.0 cm^2      LV V1 max PG 7.0 mmHg      LV V1 mean PG 3.7 mmHg      LV V1 max 132.2 cm/sec      LV V1 mean 91.0 cm/sec      LV V1 VTI 30.5 cm      SV(Ao) 201.9 ml      SI(Ao) 95.8 ml/m^2      SV(LVOT) 83.5 ml      SI(LVOT) 39.6 ml/m^2      PA V2 max 107.7 cm/sec      PA max PG 4.6 mmHg      PA max PG (full) 0.62 mmHg      PA V2 mean 74.8 cm/sec      PA mean PG 2.5 mmHg      PA mean PG (full) 0.33 mmHg      PA V2 VTI 24.3 cm      PA acc time 0.1 sec      RV V1 max PG 4.0 mmHg      RV V1 mean PG 2.2 mmHg      RV V1 max 100.2 cm/sec      RV V1 mean 68.6 cm/sec      RV V1 VTI 23.3 cm      TR max misael 237.3 cm/sec      RVSP(TR) 25.5 mmHg      RAP systole 3.0 mmHg      PA pr(Accel) 32.3 mmHg      Pulm Sys Misael 67.7 cm/sec      Pulm Schroeder Misael 31.2 cm/sec      Pulm S/D 2.2     Pulm A Revs Dur 0.11 sec      Pulm A Revs Misael 31.8 cm/sec      BH CV ECHO TRESA - BZI_BMI 32.7 kilograms/m^2      BH CV ECHO TRESA - BSA(HAYCOCK) 2.2 m^2      BH CV ECHO TRESA - BZI_METRIC_WEIGHT 97.5 kg      BH CV ECHO TRESA - BZI_METRIC_HEIGHT 172.7 cm      Target HR (85%) 122 bpm      Max. Pred. HR (100%) 143 bpm      EF(MOD-bp) 68.0 %      LA dimension(2D) 4.4 cm      Echo EF Estimated 65 %     Narrative:       · Left ventricular wall thickness is consistent with concentric   hypertrophy.  · Estimated EF = 65%.  ·  "Left ventricular systolic function is normal.  · Left atrial cavity size is mildly dilated.  · Left ventricular diastolic dysfunction (grade I) consistent with   impaired relaxation.               Medication Review:   I have reviewed the patient's current medication list  Scheduled Meds:  amLODIPine 5 mg Oral Daily   apixaban 5 mg Oral Q12H   atorvastatin 10 mg Oral Daily   hydroCHLOROthiazide 12.5 mg Oral Daily   losartan 100 mg Oral Daily   metoprolol tartrate 25 mg Oral Q12H   sodium chloride 10 mL Intravenous Q12H     Continuous Infusions:   PRN Meds:.•  acetaminophen **OR** acetaminophen **OR** acetaminophen  •  ALPRAZolam  •  aluminum-magnesium hydroxide-simethicone  •  bisacodyl  •  magnesium hydroxide  •  magnesium sulfate **OR** magnesium sulfate in D5W 1g/100mL (PREMIX)  •  melatonin  •  methocarbamol  •  nitroglycerin  •  ondansetron **OR** ondansetron  •  ondansetron  •  potassium chloride  •  [COMPLETED] Insert peripheral IV **AND** sodium chloride  •  sodium chloride    Imaging:  Imaging Results (Last 72 Hours)     Procedure Component Value Units Date/Time    XR Chest 1 View [[id number]] Collected:  06/23/20 1724     Updated:  06/23/20 1727    Narrative:       Examination: XR CHEST 1 VW-     Date of Exam: 6/23/2020 5:10 PM     Indication: chest pain.     Comparison: None available.     Technique: 1 view of the chest      Findings:  The heart size is normal. There is prominence of the bilateral pulmonary  vascular markings consistent with mild pulmonary edema. There are no  focal infiltrates. There is slight elevation right hemidiaphragm.       Impression:       Mild pulmonary edema     Electronically Signed By-Christ Dias On:6/23/2020 5:25 PM  This report was finalized on [id number] by  Christ Dias, .            ADRIANA Bui  06/25/20  10:33       EMR Dragon/Transcription:   \"Dictated utilizing Dragon dictation\".           Electronically signed by ADRIANA Bui, 06/25/20, 10:33 " AM.

## 2020-06-25 NOTE — DISCHARGE SUMMARY
[facility]  DISCHARGE SUMMARY        Prepared For PCP:  Abhay Benavides MD    Patient Name: Susan Garcia  : 1940  MRN: [id number]      Date of Admission:   2020    Date of Discharge:  2020    Length of stay:  LOS: 0 days     Hospital Course     Presenting Problem:   Atrial fibrillation, unspecified type (CMS/HCC) [I48.91]      Active Hospital Problems    Diagnosis  POA   • Muscle spasm [M62.838]  Yes     Priority: Medium   • Obesity (BMI 30-39.9) [E66.9]  Yes     Priority: Medium   • Chronic renal insufficiency, stage III (moderate) (CMS/HCC) [N18.3]  Yes     Priority: Medium   • Anxiety disorder [F41.9]  Yes     Priority: Medium   • Dyslipidemia [E78.5]  Yes     Priority: Medium   • Essential hypertension [I10]  Yes     Priority: Medium   • Celiac disease [K90.0]  Yes     Priority: Low      Resolved Hospital Problems    Diagnosis Date Resolved POA   • **Atrial fibrillation (CMS/HCC) [I48.91] 2020 Yes     Priority: High           Hospital Course:    The patient was placed on observation for new onset atrial fibrillation.  She was evaluated by cardiologist and underwent stress test on 2020 which was negative for ischemia.  She was started on xarelto and will be discharged home in the morning of 2020.  The patient has multiple stressors at home and needs to follow-up with her PCP for management of anxiety.      List of problems during hospitalization:    New atrial fibrillation with RVR:  -Cardiology consulted  -s/p stress test 2020--> negative for ischemia  -Checked TSH  -COVID-19 swab--> negative  -Started on Xarelto and metoprolol.         Essential hypertension:  -DC Coreg and amlodipine to minimize hypotension.  -Continue home hydrochlorothiazide, Cozaar     Muscle spasms  -Continue home Robaxin     Hyperlipidemia  -Continue home statin     CKD stage III:  -At baseline  -Nephrotoxin holds     Anxiety:  -Stopped  Lexapro 2 weeks ago  -Continue home Xanax     Celiac disease:  -Gluten-free diet     History of seizure disorder  -Not on home medication for this  -Has not had seizures for several years.                Recommendation for Outpatient Providers:             Reasons For Change In Medications and Indications for New Medications:        Day of Discharge     HPI:     The patient is a  79 y.o. female  with history of anxiety, hypertension, hyperlipidemia, PAD that presented to the ED on 6/23/2020 complaining of palpitations.  The patient also claimed to have had brief episode of syncope that was preceded by dizziness about 4 weeks ago when she was going up the steps.  Since then, she has been having generalized fatigue and was treated with Macrobid for UTI at [place] [facility].  She continued to have generalized fatigue and intermittent nausea and lack of appetite despite taking Macrobid.  She stopped taking Lexapro 2 weeks ago thinking that it was causing some of her symptoms.  Her  has been having diarrhea for about 4 weeks but she has been constipated.       She denied fevers, chills, sweats, orthopnea, PND, nausea, vomiting or abdominal pain.  The patient has been has been having diarrhea for about 4 weeks.        Vital Signs:   Temp:  [97.6 °F (36.4 °C)-97.9 °F (36.6 °C)] 97.6 °F (36.4 °C)  Heart Rate:  [52-64] 59  Resp:  [14-18] 18  BP: ()/(66-82) 103/71     Physical Exam:  Physical Exam   Constitutional: She is oriented to person, place, and time. She appears well-developed and well-nourished.   HENT:   Head: Normocephalic and atraumatic.   Eyes: Pupils are equal, round, and reactive to light. EOM are normal.   Neck: Normal range of motion. Neck supple.   Cardiovascular: Normal rate and regular rhythm.   Pulmonary/Chest: Effort normal and breath sounds normal.   Abdominal: Soft. Bowel sounds are normal.   Musculoskeletal: Normal range of motion.   Neurological: She is alert and oriented to person, place, and  time.   Skin: Skin is warm and dry.   Psychiatric: She has a normal mood and affect.       Pertinent  and/or Most Recent Results     Results from last 7 days   Lab Units 06/25/20  0443 06/24/20  1219 06/24/20  0833 06/23/20  1732   WBC 10*3/mm3 13.50*  --  14.50* 12.00*   HEMOGLOBIN g/dL 13.1  --  14.0 13.5   HEMATOCRIT % 38.2  --  40.1 38.4   PLATELETS 10*3/mm3 329  --  345 358   SODIUM mmol/L 137 138  --  133*   POTASSIUM mmol/L 3.9 3.9  --  3.7   CHLORIDE mmol/L 102 101  --  98   CO2 mmol/L 24.0 27.0  --  23.0   BUN  32* 28*  --  35*   CREATININE mg/dL 1.10* 1.11*  --  1.11*   GLUCOSE mg/dL 92 110*  --  146*   CALCIUM mg/dL 8.8 8.9  --  9.3     Results from last 7 days   Lab Units 06/23/20  1732   BILIRUBIN mg/dL 0.4   ALK PHOS U/L 126*   ALT (SGPT) U/L 13   AST (SGOT) U/L 16           Invalid input(s): TG, LDLCALC, LDLREALC  Results from last 7 days   Lab Units 06/24/20  1219 06/24/20  0044 06/23/20  1732   TSH uIU/mL  --   --  1.240   PROBNP pg/mL  --   --  285.8   TROPONIN T ng/mL <0.010 <0.010 <0.010       Brief Urine Lab Results  (Last result in the past 365 days)      Color   Clarity   Blood   Leuk Est   Nitrite   Protein   CREAT   Urine HCG        06/23/20 1747 Yellow Clear Trace Negative Negative Negative               Microbiology Results Abnormal     Procedure Component Value - Date/Time    COVID-19,CEPHEID,COR/DAVID/PAD IN-HOUSE(OR EMERGENT/ADD-ON),NP SWAB IN TRANSPORT MEDIA 3-4 HR TAT - Swab, Nasopharynx [[id number]]  (Normal) Collected:  06/24/20 1017    Lab Status:  Final result Specimen:  Swab from Nasopharynx Updated:  06/24/20 1121     COVID19 Not Detected    Narrative:       Fact sheet for providers: https://www.fda.gov/media/125228/download     Fact sheet for patients: https://www.fda.gov/media/320763/download          Xr Chest 1 View    Result Date: 6/23/2020  Impression: Mild pulmonary edema  Electronically Signed By-Christ Dias On:6/23/2020 5:25 PM This report was finalized on [id number] by   Christ Dias, .                Results for orders placed during the hospital encounter of 06/23/20   Adult Transthoracic Echo Complete W/ Cont if Necessary Per Protocol    Narrative · Left ventricular wall thickness is consistent with concentric   hypertrophy.  · Estimated EF = 65%.  · Left ventricular systolic function is normal.  · Left atrial cavity size is mildly dilated.  · Left ventricular diastolic dysfunction (grade I) consistent with   impaired relaxation.                  Test Results Pending at Discharge        Procedures Performed           Consults:   Consults     Date and Time Order Name Status Description    6/23/2020 2052 Inpatient Cardiology Consult Completed     6/23/2020 1854 Hospitalist (on-call MD unless specified) Completed             Discharge Details        Discharge Medications      New Medications      Instructions Start Date   metoprolol tartrate 25 MG tablet  Commonly known as:  LOPRESSOR   25 mg, Oral, Every 12 Hours Scheduled      rivaroxaban 20 MG tablet  Commonly known as:  Xarelto   20 mg, Oral, Daily With Dinner         Continue These Medications      Instructions Start Date   ALPRAZolam 0.25 MG tablet  Commonly known as:  XANAX   0.25 mg, Oral, Nightly PRN      aspirin 81 MG chewable tablet   81 mg, Oral, Daily      DULoxetine 30 MG capsule  Commonly known as:  CYMBALTA   30 mg, Oral, Daily      hydroCHLOROthiazide 12.5 MG tablet  Commonly known as:  HYDRODIURIL   12.5 mg, Oral, Daily      losartan 100 MG tablet  Commonly known as:  COZAAR   TAKE 1 TABLET BY MOUTH EVERY DAY      methocarbamol 500 MG tablet  Commonly known as:  ROBAXIN   500 mg, Oral, 3 Times Daily PRN      ondansetron 4 MG tablet  Commonly known as:  ZOFRAN   4 mg, Oral, Every 8 Hours PRN      simvastatin 10 MG tablet  Commonly known as:  ZOCOR   10 mg, Oral, Nightly         Stop These Medications    amLODIPine 2.5 MG tablet  Commonly known as:  NORVASC     carvedilol 12.5 MG tablet  Commonly known as:  COREG             No Known Allergies      Discharge Disposition:  Home or Self Care    Diet:  Hospital:  Diet Order   Procedures   • Diet Cardiac; Healthy Heart         Discharge Activity:   Activity Instructions     As tolerated                 CODE STATUS:    Code Status and Medical Interventions:   Ordered at: 06/23/20 2010     Code Status:    CPR     Medical Interventions (Level of Support Prior to Arrest):    Full         Follow-up Appointments  Future Appointments   Date Time Provider Department Center   7/28/2020  8:30 AM Cecilia Caputo MD [facility] None       Additional Instructions for the Follow-ups that You Need to Schedule     Discharge Follow-up with PCP   As directed       Currently Documented PCP:    Abhay Benavides MD    PCP Phone Number:    [phone]     Follow Up Details:  2 weeks                 Condition on Discharge:      Stable      This patient has been examined wearing appropriate Personal Protective Equipment  06/25/20      Electronically signed by Jero Alfaro DO, 06/25/20, 11:24 AM.      Time: I spent 15  minutes on this discharge activity which included face-to-face encounter with the patient/reviewing the data in the system/coordination of the care with the nursing staff as well as consultants/documentation/entering orders.

## 2020-07-07 ENCOUNTER — HOSPITAL ENCOUNTER (EMERGENCY)
Facility: HOSPITAL | Age: 80
Discharge: HOME | End: 2020-07-07
Admitting: EMERGENCY MEDICINE

## 2020-07-07 VITALS
HEART RATE: 64 BPM | HEIGHT: 67 IN | DIASTOLIC BLOOD PRESSURE: 49 MMHG | WEIGHT: 213.19 LBS | RESPIRATION RATE: 17 BRPM | SYSTOLIC BLOOD PRESSURE: 117 MMHG | OXYGEN SATURATION: 99 % | TEMPERATURE: 97.8 F | BODY MASS INDEX: 33.46 KG/M2

## 2020-07-07 DIAGNOSIS — N39.0 URINARY TRACT INFECTION WITHOUT HEMATURIA, SITE UNSPECIFIED: Primary | ICD-10-CM

## 2020-07-07 DIAGNOSIS — R11.0 NAUSEA: ICD-10-CM

## 2020-07-07 LAB
ALBUMIN SERPL-MCNC: 3.4 G/DL (ref 3.5–5.2)
ALBUMIN/GLOB SERPL: 1.2 G/DL
ALP SERPL-CCNC: 79 U/L (ref 39–117)
ALT SERPL W P-5'-P-CCNC: 16 U/L (ref 1–33)
ANION GAP SERPL CALCULATED.3IONS-SCNC: 11 MMOL/L (ref 5–15)
AST SERPL-CCNC: 16 U/L (ref 1–32)
BACTERIA UR QL AUTO: ABNORMAL /HPF
BASOPHILS # BLD AUTO: 0 10*3/MM3 (ref 0–0.2)
BASOPHILS NFR BLD AUTO: 0.4 % (ref 0–1.5)
BILIRUB SERPL-MCNC: 0.2 MG/DL (ref 0–1.2)
BILIRUB UR QL STRIP: NEGATIVE
BUN SERPL-MCNC: 20 MG/DL (ref 8–23)
BUN SERPL-MCNC: ABNORMAL MG/DL
BUN/CREAT SERPL: ABNORMAL
CALCIUM SPEC-SCNC: 9.5 MG/DL (ref 8.6–10.5)
CHLORIDE SERPL-SCNC: 97 MMOL/L (ref 98–107)
CLARITY UR: ABNORMAL
CO2 SERPL-SCNC: 25 MMOL/L (ref 22–29)
COLOR UR: YELLOW
CREAT SERPL-MCNC: 1.57 MG/DL (ref 0.57–1)
DEPRECATED RDW RBC AUTO: 55.6 FL (ref 37–54)
EOSINOPHIL # BLD AUTO: 0.2 10*3/MM3 (ref 0–0.4)
EOSINOPHIL NFR BLD AUTO: 2.5 % (ref 0.3–6.2)
ERYTHROCYTE [DISTWIDTH] IN BLOOD BY AUTOMATED COUNT: 17 % (ref 12.3–15.4)
GFR SERPL CREATININE-BSD FRML MDRD: 32 ML/MIN/1.73
GLOBULIN UR ELPH-MCNC: 2.9 GM/DL
GLUCOSE SERPL-MCNC: 117 MG/DL (ref 65–99)
GLUCOSE UR STRIP-MCNC: NEGATIVE MG/DL
HCT VFR BLD AUTO: 34 % (ref 34–46.6)
HGB BLD-MCNC: 11.7 G/DL (ref 12–15.9)
HGB UR QL STRIP.AUTO: ABNORMAL
HYALINE CASTS UR QL AUTO: ABNORMAL /LPF
KETONES UR QL STRIP: NEGATIVE
LEUKOCYTE ESTERASE UR QL STRIP.AUTO: ABNORMAL
LYMPHOCYTES # BLD AUTO: 0.9 10*3/MM3 (ref 0.7–3.1)
LYMPHOCYTES NFR BLD AUTO: 10.4 % (ref 19.6–45.3)
MCH RBC QN AUTO: 33.4 PG (ref 26.6–33)
MCHC RBC AUTO-ENTMCNC: 34.3 G/DL (ref 31.5–35.7)
MCV RBC AUTO: 97.2 FL (ref 79–97)
MONOCYTES # BLD AUTO: 1.6 10*3/MM3 (ref 0.1–0.9)
MONOCYTES NFR BLD AUTO: 19 % (ref 5–12)
NEUTROPHILS NFR BLD AUTO: 5.6 10*3/MM3 (ref 1.7–7)
NEUTROPHILS NFR BLD AUTO: 67.7 % (ref 42.7–76)
NITRITE UR QL STRIP: NEGATIVE
NRBC BLD AUTO-RTO: 0 /100 WBC (ref 0–0.2)
PH UR STRIP.AUTO: 7 [PH] (ref 5–8)
PLATELET # BLD AUTO: 241 10*3/MM3 (ref 140–450)
PMV BLD AUTO: 7.5 FL (ref 6–12)
POTASSIUM SERPL-SCNC: 4.3 MMOL/L (ref 3.5–5.2)
PROT SERPL-MCNC: 6.3 G/DL (ref 6–8.5)
PROT UR QL STRIP: NEGATIVE
RBC # BLD AUTO: 3.5 10*6/MM3 (ref 3.77–5.28)
RBC # UR: ABNORMAL /HPF
REF LAB TEST METHOD: ABNORMAL
SODIUM SERPL-SCNC: 133 MMOL/L (ref 136–145)
SP GR UR STRIP: 1.01 (ref 1–1.03)
SQUAMOUS #/AREA URNS HPF: ABNORMAL /HPF
UROBILINOGEN UR QL STRIP: ABNORMAL
WBC # BLD AUTO: 8.3 10*3/MM3 (ref 3.4–10.8)
WBC UR QL AUTO: ABNORMAL /HPF

## 2020-07-07 PROCEDURE — 85025 COMPLETE CBC W/AUTO DIFF WBC: CPT | Performed by: PHYSICIAN ASSISTANT

## 2020-07-07 PROCEDURE — 96374 THER/PROPH/DIAG INJ IV PUSH: CPT

## 2020-07-07 PROCEDURE — 25010000002 ONDANSETRON PER 1 MG: Performed by: PHYSICIAN ASSISTANT

## 2020-07-07 PROCEDURE — 99283 EMERGENCY DEPT VISIT LOW MDM: CPT

## 2020-07-07 PROCEDURE — 80053 COMPREHEN METABOLIC PANEL: CPT | Performed by: PHYSICIAN ASSISTANT

## 2020-07-07 PROCEDURE — 81001 URINALYSIS AUTO W/SCOPE: CPT | Performed by: PHYSICIAN ASSISTANT

## 2020-07-07 RX ORDER — CEFDINIR 300 MG/1
300 CAPSULE ORAL 2 TIMES DAILY
Qty: 14 CAPSULE | Refills: 0 | Status: SHIPPED | OUTPATIENT
Start: 2020-07-07 | End: 2020-11-03

## 2020-07-07 RX ORDER — SODIUM CHLORIDE 0.9 % (FLUSH) 0.9 %
10 SYRINGE (ML) INJECTION AS NEEDED
Status: DISCONTINUED | OUTPATIENT
Start: 2020-07-07 | End: 2020-07-07 | Stop reason: HOSPADM

## 2020-07-07 RX ORDER — ONDANSETRON 2 MG/ML
4 INJECTION INTRAMUSCULAR; INTRAVENOUS ONCE
Status: COMPLETED | OUTPATIENT
Start: 2020-07-07 | End: 2020-07-07

## 2020-07-07 RX ADMIN — ONDANSETRON 4 MG: 2 INJECTION, SOLUTION INTRAMUSCULAR; INTRAVENOUS at 09:42

## 2020-07-07 RX ADMIN — SODIUM CHLORIDE 500 ML: 900 INJECTION, SOLUTION INTRAVENOUS at 09:42

## 2020-07-07 NOTE — ED NOTES
Called [facility] medical records for a urine culture result that was done a few days ago.     Maria Isabel Urrutia  07/07/20 0957

## 2020-07-07 NOTE — DISCHARGE INSTRUCTIONS
Stop taking Bactrim.  Take cefdinir for urinary tract infection.  May take Zofran as needed for nausea.  Drink plenty of fluids.  Practice good hygiene when using the restroom.    Follow-up with primary care as needed for new or worsening concerns as well as blood pressure check in the next 4 weeks.  Follow-up with your nephrologist, Dr. Acevedo    Return to the ER for new or worsening symptoms.

## 2020-07-07 NOTE — ED PROVIDER NOTES
Subjective   Chief Complaint: Dysuria    Patient is a 79 year old  female with history of paroxysmal A. fib, CKD stage III, hypertension presents the ER with chief complaint of dysuria, nausea for 3 days.  Patient states that she was seen in urgent care clinic 3 days ago, diagnosed with UTI and discharged with Bactrim.  Patient states her symptoms have not resolved, still complained of some dysuria, lower abdominal pain nausea.  She describes the abdominal pain as a constant cramping pain that she rates 5/10.  She denies any vomiting diarrhea or gross hematuria.  She denies any chest pain, shortness of breath headache or fever or chills.  Patient states he took Zofran about 3 hours prior to ER arrival with no relief.      Location: Lower abdomen    Quality: Cramping    Duration: 3 days    Timing:    Severity: Mild    Associated Symptoms: Dysuria, nausea    PCP: Adrienne Humphries      History provided by:  Patient      Review of Systems   Constitutional: Negative for chills and fever.   HENT: Negative for sore throat and trouble swallowing.    Respiratory: Negative for shortness of breath and wheezing.    Cardiovascular: Negative for chest pain.   Gastrointestinal: Positive for abdominal pain and nausea. Negative for diarrhea and vomiting.   Genitourinary: Positive for dysuria. Negative for difficulty urinating and hematuria.   Skin: Negative for rash.   Neurological: Negative for dizziness, weakness and headaches.   Psychiatric/Behavioral: Negative for behavioral problems.   All other systems reviewed and are negative.      Past Medical History:   Diagnosis Date   • Anxiety    • Atrial fibrillation (CMS/HCC)    • Hyperlipidemia    • Hypertension    • Obesity (BMI 30-39.9) 6/23/2020   • Peripheral arterial disease (CMS/HCC)    • Seizure disorder (CMS/HCC)        No Known Allergies    Past Surgical History:   Procedure Laterality Date   • APPENDECTOMY     • CHOLECYSTECTOMY     • COLONOSCOPY     • TONSILLECTOMY    "  • TOTAL ABDOMINAL HYSTERECTOMY     • TOTAL KNEE ARTHROPLASTY Bilateral        Family History   Problem Relation Age of Onset   • COPD Mother    • Stroke Father        Social History     Socioeconomic History   • Marital status:      Spouse name: Not on file   • Number of children: Not on file   • Years of education: Not on file   • Highest education level: Not on file   Tobacco Use   • Smoking status: Never Smoker   • Smokeless tobacco: Never Used   Substance and Sexual Activity   • Alcohol use: No     Frequency: Never   • Drug use: No           Objective   Physical Exam   Constitutional: She is oriented to person, place, and time. She appears well-developed and well-nourished.  Non-toxic appearance. She does not appear ill. No distress.   HENT:   Head: Normocephalic and atraumatic.   Mouth/Throat: Oropharynx is clear and moist.   Eyes: Pupils are equal, round, and reactive to light. EOM are normal.   Cardiovascular: Normal rate, regular rhythm, normal heart sounds and intact distal pulses.   No murmur heard.  Pulmonary/Chest: Effort normal and breath sounds normal. No respiratory distress. She has no wheezes.   Abdominal: Soft. Normal appearance and bowel sounds are normal. She exhibits no distension and no mass. There is tenderness. There is no guarding and no CVA tenderness.   Lower abdominal tenderness   Neurological: She is alert and oriented to person, place, and time.   Skin: Skin is warm and dry. Capillary refill takes less than 2 seconds. No rash noted.   Psychiatric: She has a normal mood and affect. Her behavior is normal.   Nursing note and vitals reviewed.      Procedures           ED Course  ED Course as of Jul 07 1140   Tue Jul 07, 2020   0918 Urine culture results are urgent care clinic received, urine culture positive for E. coli    [MM]      ED Course User Index  [MM] Gracie Mansfield PA    BP (!) 112/38   Pulse 61   Temp 98.2 °F (36.8 °C) (Oral)   Resp 18   Ht 170.2 cm (67\")   Wt " 96.7 kg (213 lb 3 oz)   SpO2 99%   BMI 33.39 kg/m²   Labs Reviewed   COMPREHENSIVE METABOLIC PANEL - Abnormal; Notable for the following components:       Result Value    Glucose 117 (*)     Creatinine 1.57 (*)     Sodium 133 (*)     Chloride 97 (*)     Albumin 3.40 (*)     eGFR Non  Amer 32 (*)     All other components within normal limits    Narrative:     GFR Normal >60  Chronic Kidney Disease <60  Kidney Failure <15     URINALYSIS W/ CULTURE IF INDICATED - Abnormal; Notable for the following components:    Appearance, UA Hazy (*)     Blood, UA Large (3+) (*)     Leuk Esterase, UA Trace (*)     All other components within normal limits   CBC WITH AUTO DIFFERENTIAL - Abnormal; Notable for the following components:    RBC 3.50 (*)     Hemoglobin 11.7 (*)     MCV 97.2 (*)     MCH 33.4 (*)     RDW 17.0 (*)     RDW-SD 55.6 (*)     Lymphocyte % 10.4 (*)     Monocyte % 19.0 (*)     Monocytes, Absolute 1.60 (*)     All other components within normal limits   URINALYSIS, MICROSCOPIC ONLY - Abnormal; Notable for the following components:    RBC, UA Too Numerous to Count (*)     WBC, UA 0-2 (*)     All other components within normal limits   BUN - Normal   CBC AND DIFFERENTIAL    Narrative:     The following orders were created for panel order CBC & Differential.  Procedure                               Abnormality         Status                     ---------                               -----------         ------                     CBC Auto Differential[[id number]]        Abnormal            Final result                 Please view results for these tests on the individual orders.     Medications   sodium chloride 0.9 % flush 10 mL (has no administration in time range)   sodium chloride 0.9 % bolus 500 mL (0 mL Intravenous Stopped 7/7/20 1108)   ondansetron (ZOFRAN) injection 4 mg (4 mg Intravenous Given 7/7/20 0942)     No radiology results for the last day                                         [facility]  Number of  Diagnoses or Management Options  Nausea:   Urinary tract infection without hematuria, site unspecified:   Diagnosis management comments: MEDICAL DECISION  Epic Chart Review: Patient seen at [facility] urgent care 3 days ago, diagnosed with UTI, discharged with Bactrim.  Urine culture was obtained from medical records at [facility], culture showed evidence of E. coli.  Comorbidities: A. fib, recurrent UTIs, CKD stage III  Differentials: UTI, cystitis, nephrolithiasis, pyelonephritis; this list is not all inclusive and does not constitute the entirety of considered causes  Radiology interpretation: Not warranted  Lab interpretation:  Labs viewed by me significant for, urinalysis hazy, TNTC RBCs, 0-2 WBCs, no bacteria, trace leukocytes, no nitrites.  CMP glucose 117, creatinine 1.57, chloride 97, sodium 133.  CBC RBCs low 3.5, hemoglobin 11.7.  BUN normal 20.    While in the ED IV was placed and labs were obtained appropriate PPE was worn during exam and throughout all encounters with the patient.  Patient was given Zofran for nausea.  Patient had the above evaluation.  Physical exam is unremarkable, mild lower abdominal tenderness and cramping, otherwise unremarkable, no CVA tenderness, no flank pain, no episodes of emesis or diarrhea while in the ER.  Lab work is as shown above, urinalysis significant for hematuria, chemistries show mild renal insufficiency that is worse since 2 weeks ago.  Patiently most likely has UTI, culture results were obtained from [facility] which showed culture positive for E. coli.  Spoke with pharmacist, Mirena regarding culture results and antibiotic choice, recommended DC Bactrim and start on cefdinir for adequate coverage.  Patient's acute renal insufficiency most likely due to UTI and decreased oral intake.  Her chart review patient had gross hematuria 3 days ago and urinalysis, this is not new today.  Patient was discharged with a prescription for cefdinir, advised to follow-up with PCP or  urology for further evaluation and management.  Patient verbalized understanding, agreement with plan of discharge.    Discharge plan and instructions were discussed with the patient who verbalized understanding and is in agreement with the plan, all questions were answered at this time.  Patient is aware of signs symptoms that would require immediate return to the emergency room.  Patient understands importance of following up with primary care provider for further evaluation and worsening concerns as well as blood pressure recheck in the next 4 weeks.    Patient remained afebrile, nontoxic-appearing, no acute respiratory distress throughout entire emergency room stay.  Patient was discharged in improved stable condition with an upright steady gait.         Amount and/or Complexity of Data Reviewed  Clinical lab tests: reviewed and ordered    Patient Progress  Patient progress: stable      Final diagnoses:   Urinary tract infection without hematuria, site unspecified   Nausea            Gracie Mansfield PA  07/07/20 5805

## 2020-07-28 ENCOUNTER — OFFICE VISIT (OUTPATIENT)
Dept: CARDIOLOGY | Facility: CLINIC | Age: 80
End: 2020-07-28

## 2020-07-28 VITALS
OXYGEN SATURATION: 94 % | HEART RATE: 64 BPM | WEIGHT: 204 LBS | BODY MASS INDEX: 31.95 KG/M2 | DIASTOLIC BLOOD PRESSURE: 69 MMHG | SYSTOLIC BLOOD PRESSURE: 126 MMHG

## 2020-07-28 DIAGNOSIS — I48.0 PAROXYSMAL ATRIAL FIBRILLATION (HCC): ICD-10-CM

## 2020-07-28 DIAGNOSIS — I48.0 AF (PAROXYSMAL ATRIAL FIBRILLATION) (HCC): ICD-10-CM

## 2020-07-28 DIAGNOSIS — I10 ESSENTIAL HYPERTENSION: Primary | Chronic | ICD-10-CM

## 2020-07-28 DIAGNOSIS — E78.2 MIXED HYPERLIPIDEMIA: ICD-10-CM

## 2020-07-28 PROCEDURE — 99214 OFFICE O/P EST MOD 30 MIN: CPT | Performed by: INTERNAL MEDICINE

## 2020-07-28 RX ORDER — CARVEDILOL 12.5 MG/1
12.5 TABLET ORAL 2 TIMES DAILY WITH MEALS
COMMUNITY

## 2020-07-28 RX ORDER — AMLODIPINE BESYLATE 10 MG/1
10 TABLET ORAL DAILY
COMMUNITY

## 2020-07-28 NOTE — PROGRESS NOTES
Cardiology Office Visit      Encounter Date:  07/28/2020    Patient ID:   Susan Garcia is a 80 y.o. female.    Reason For Followup:  Hypertension  Hyperlipidemia  Recent hospitalization for atrial fibrillation    Brief Clinical History:  Dear Dr. Humphries, Adrienne Rice MD    I had the pleasure of seeing Susan Garcia today. As you are well aware, this is a 80 y.o. female past medical history there is significant for history of hypertension hyperlipidemia and also peripheral vascular disease here for follow-up for recent hospitalization for atrial fibrillation      Interval History:  Patient denies any further episodes of atrial fibrillation and also says that she is under a lot of stress at home is probably what contributed to atrial fibrillation  Likes to come off the anticoagulation therapy  Denies any chest pain shortness of breath dizziness or syncope  Recent UTI and dehydration   Assessment & Plan    Impressions:  1.  hypertension:  patient was initially seen and evaluated for poorly controlled hypertension patient did have some extensive workup for secondary hypertension all those were negative and patient is currently doing better blood pressure is well controlled with the current treatment.   patient had a prior renal CT angiogram showing no evidence of any renal artery stenosis   continue home blood pressure monitoring  Blood pressure is controlled with current therapy     2.  hyperlipidemia:  patient is currently on statin.      3.  Mild renal insufficiency, secondary to hypertensive nephropathy.  Continue close monitoring on renal function, follow-up with Nephrology    4.  Peripheral arterial disease, carotid ultrasound with less than 50% stenosis    5.  Paroxysmal atrial fibrillation currently in sinus rhythm, unable to take the metoprolol secondary to side effects patient is back on Coreg and tolerating it well    Plan to continue anticoagulation therapy for now  Recent hospitalization with a  stress test and echocardiogram with no significant findings  Stress test with fixed perfusion defect likely attenuation artifact  Echocardiogram with normal LV systolic function    Recommendations:  Labs discussed with the patient  Considered to recheck BMP again to assess the renal function most likely worsening renal function secondary to use of Bactrim  Check a Holter monitor if there is no evidence of any further atrial fibrillation we will discuss with the patient about risk benefits and alternatives for discontinuing anticoagulation therapy and continuing with antiplatelet therapy  Risk benefits and alternatives discussed with patient  Schedule for Holter monitor for 48 hours  Continue anticoagulation therapy until the Holter monitor results are back  Follow-up in office in 3 months  Objective:    Vitals:  Vitals:    07/28/20 0831   BP: 126/69   Pulse: 64   SpO2: 94%   Weight: 92.5 kg (204 lb)       Physical Exam:    General: Alert, cooperative, no distress, appears stated age  Head:  Normocephalic, atraumatic, mucous membranes moist  Eyes:  Conjunctiva/corneas clear, EOM's intact     Neck:  Supple,  no adenopathy;      Lungs: Clear to auscultation bilaterally, no wheezes rhonchi rales are noted  Chest wall: No tenderness  Heart::  Regular rate and rhythm, S1 and S2 normal, no murmur, rub or gallop  Abdomen: Soft, non-tender, nondistended bowel sounds active  Extremities: No cyanosis, clubbing, or edema  Pulses: 2+ and symmetric all extremities  Skin:  No rashes or lesions  Neuro/psych: A&O x3. CN II through XII are grossly intact with appropriate affect      Allergies:  No Known Allergies    Medication Review:     Current Outpatient Medications:   •  ALPRAZolam (XANAX) 0.25 MG tablet, Take 0.25 mg by mouth At Night As Needed for Anxiety., Disp: , Rfl:   •  amLODIPine (NORVASC) 5 MG tablet, Take 5 mg by mouth Daily., Disp: , Rfl:   •  aspirin 81 MG chewable tablet, Chew 81 mg Daily., Disp: , Rfl:   •   carvedilol (COREG) 12.5 MG tablet, Take 12.5 mg by mouth 2 (Two) Times a Day With Meals., Disp: , Rfl:   •  DULoxetine (CYMBALTA) 30 MG capsule, Take 30 mg by mouth Daily., Disp: , Rfl:   •  hydrochlorothiazide (HYDRODIURIL) 12.5 MG tablet, Take 12.5 mg by mouth Daily., Disp: , Rfl:   •  losartan (COZAAR) 100 MG tablet, TAKE 1 TABLET BY MOUTH EVERY DAY, Disp: 90 tablet, Rfl: 0  •  methocarbamol (ROBAXIN) 500 MG tablet, Take 500 mg by mouth 3 (Three) Times a Day As Needed for Muscle Spasms., Disp: , Rfl:   •  ondansetron (ZOFRAN) 4 MG tablet, Take 4 mg by mouth Every 8 (Eight) Hours As Needed for Nausea or Vomiting., Disp: , Rfl:   •  rivaroxaban (Xarelto) 20 MG tablet, Take 1 tablet by mouth Daily With Dinner., Disp: 30 tablet, Rfl: 0  •  simvastatin (ZOCOR) 10 MG tablet, Take 10 mg by mouth Every Night., Disp: , Rfl:   •  cefdinir (OMNICEF) 300 MG capsule, Take 1 capsule by mouth 2 (Two) Times a Day., Disp: 14 capsule, Rfl: 0  •  metoprolol tartrate (LOPRESSOR) 25 MG tablet, Take 1 tablet by mouth Every 12 (Twelve) Hours., Disp: 60 tablet, Rfl: 0    Family History:  Family History   Problem Relation Age of Onset   • COPD Mother    • Stroke Father        Past Medical History:  Past Medical History:   Diagnosis Date   • Anxiety    • Atrial fibrillation (CMS/HCC)    • Hyperlipidemia    • Hypertension    • Obesity (BMI 30-39.9) 6/23/2020   • Peripheral arterial disease (CMS/HCC)    • Seizure disorder (CMS/HCC)        Past surgical History:  Past Surgical History:   Procedure Laterality Date   • APPENDECTOMY     • CHOLECYSTECTOMY     • COLONOSCOPY     • TONSILLECTOMY     • TOTAL ABDOMINAL HYSTERECTOMY     • TOTAL KNEE ARTHROPLASTY Bilateral        Social History:  Social History     Socioeconomic History   • Marital status:      Spouse name: Not on file   • Number of children: Not on file   • Years of education: Not on file   • Highest education level: Not on file   Tobacco Use   • Smoking status: Never Smoker    • Smokeless tobacco: Never Used   Substance and Sexual Activity   • Alcohol use: No     Frequency: Never   • Drug use: No       Review of Systems:  The following systems were reviewed as they relate to the cardiovascular system: Constitutional, Eyes, ENT, Cardiovascular, Respiratory, Gastrointestinal, Integumentary, Neurological, Psychiatric, Hematologic, Endocrine, Musculoskeletal, and Genitourinary. The pertinent cardiovascular findings are reported above with all other cardiovascular points within those systems being negative.    Diagnostic Study Review:     Current Electrocardiogram:  Procedures      NOTE: The following portions of the patient's history were reviewed and updated this visit as appropriate: allergies, current medications, past family history, past medical history, past social history, past surgical history and problem list.

## 2020-07-29 ENCOUNTER — HOSPITAL ENCOUNTER (OUTPATIENT)
Dept: CARDIOLOGY | Facility: HOSPITAL | Age: 80
Discharge: HOME | End: 2020-07-29
Admitting: INTERNAL MEDICINE

## 2020-07-29 DIAGNOSIS — I48.0 AF (PAROXYSMAL ATRIAL FIBRILLATION) (HCC): ICD-10-CM

## 2020-07-29 DIAGNOSIS — I10 ESSENTIAL HYPERTENSION: ICD-10-CM

## 2020-07-29 PROCEDURE — 93225 XTRNL ECG REC<48 HRS REC: CPT

## 2020-08-10 PROCEDURE — 93227 XTRNL ECG REC<48 HR R&I: CPT | Performed by: INTERNAL MEDICINE

## 2020-08-20 ENCOUNTER — TELEPHONE (OUTPATIENT)
Dept: CARDIOLOGY | Facility: CLINIC | Age: 80
End: 2020-08-20

## 2020-08-20 NOTE — TELEPHONE ENCOUNTER
Called and informed the Pt per Dr. Warren that her holter monitor looked okay and to follow up as scheduled. Pt stated understanding.

## 2020-08-27 RX ORDER — RIVAROXABAN 20 MG/1
TABLET, FILM COATED ORAL
Qty: 30 TABLET | Refills: 3 | Status: SHIPPED | OUTPATIENT
Start: 2020-08-27 | End: 2020-12-18

## 2020-08-27 RX ORDER — LOSARTAN POTASSIUM 100 MG/1
TABLET ORAL
Qty: 90 TABLET | Refills: 1 | Status: SHIPPED | OUTPATIENT
Start: 2020-08-27 | End: 2021-04-05

## 2020-11-03 ENCOUNTER — OFFICE VISIT (OUTPATIENT)
Dept: CARDIOLOGY | Facility: CLINIC | Age: 80
End: 2020-11-03

## 2020-11-03 VITALS
SYSTOLIC BLOOD PRESSURE: 138 MMHG | RESPIRATION RATE: 18 BRPM | HEART RATE: 59 BPM | HEIGHT: 67 IN | DIASTOLIC BLOOD PRESSURE: 82 MMHG | BODY MASS INDEX: 34.21 KG/M2 | WEIGHT: 218 LBS | OXYGEN SATURATION: 96 %

## 2020-11-03 DIAGNOSIS — I48.0 PAROXYSMAL ATRIAL FIBRILLATION (HCC): ICD-10-CM

## 2020-11-03 DIAGNOSIS — I10 ESSENTIAL HYPERTENSION: Primary | Chronic | ICD-10-CM

## 2020-11-03 DIAGNOSIS — R00.1 BRADYCARDIA: ICD-10-CM

## 2020-11-03 DIAGNOSIS — E78.2 MIXED HYPERLIPIDEMIA: ICD-10-CM

## 2020-11-03 PROCEDURE — 99214 OFFICE O/P EST MOD 30 MIN: CPT | Performed by: INTERNAL MEDICINE

## 2020-11-03 PROCEDURE — 93000 ELECTROCARDIOGRAM COMPLETE: CPT | Performed by: INTERNAL MEDICINE

## 2020-11-03 NOTE — PROGRESS NOTES
Cardiology Office Visit      Encounter Date:  11/03/2020    Patient ID:   Susan Garcia is a 80 y.o. female.    Reason For Followup:  Hypertension  Hyperlipidemia  Recent hospitalization for atrial fibrillation    Brief Clinical History:  Dear Dr. Humphries, Adrienne Rice MD    I had the pleasure of seeing Susan Garcia today. As you are well aware, this is a 80 y.o. female past medical history there is significant for history of hypertension hyperlipidemia and also peripheral vascular disease here for follow-up for recent hospitalization for atrial fibrillation      Interval History:  Patient denies any further episodes of atrial fibrillation and also says that she is under a lot of stress at home is probably what contributed to atrial fibrillation  Denies any chest pain shortness of breath dizziness or syncope    Assessment & Plan    Impressions:  1.  hypertension:  patient was initially seen and evaluated for poorly controlled hypertension patient did have some extensive workup for secondary hypertension all those were negative and patient is currently doing better blood pressure is well controlled with the current treatment.   patient had a prior renal CT angiogram showing no evidence of any renal artery stenosis   continue home blood pressure monitoring  Blood pressure is controlled with current therapy     2.  hyperlipidemia:  patient is currently on statin.      3.  Mild renal insufficiency, secondary to hypertensive nephropathy.  Continue close monitoring on renal function, follow-up with Nephrology    4.  Peripheral arterial disease, carotid ultrasound with less than 50% stenosis    5.  Paroxysmal atrial fibrillation currently in sinus rhythm, unable to take the metoprolol secondary to side effects patient is back on Coreg and tolerating it well    Plan to continue anticoagulation therapy for now  Recent hospitalization with a stress test and echocardiogram with no significant findings  Stress test with  "fixed perfusion defect likely attenuation artifact  Echocardiogram with normal LV systolic function    Recommendations:  Recent Holter monitor with no significant pathology  Patient is tolerating anticoagulation therapy with no side effects  Plan to continue anticoagulation therapy unless there are some concerns for bleeding or any side effects from medication  Risk benefits and alternatives reviewed and discussed with the patient  Continue close monitoring  Continue follow-up with nephrology  Follow-up in office in 6 months  Objective:    Vitals:  Vitals:    11/03/20 1047   BP: 138/82   BP Location: Left arm   Pulse: 59   Resp: 18   SpO2: 96%   Weight: 98.9 kg (218 lb)   Height: 170.2 cm (67\")       Physical Exam:    General: Alert, cooperative, no distress, appears stated age  Head:  Normocephalic, atraumatic, mucous membranes moist  Eyes:  Conjunctiva/corneas clear, EOM's intact     Neck:  Supple,  no adenopathy;      Lungs: Clear to auscultation bilaterally, no wheezes rhonchi rales are noted  Chest wall: No tenderness  Heart::  Regular rate and rhythm, S1 and S2 normal, no murmur, rub or gallop  Abdomen: Soft, non-tender, nondistended bowel sounds active  Extremities: No cyanosis, clubbing, or edema  Pulses: 2+ and symmetric all extremities  Skin:  No rashes or lesions  Neuro/psych: A&O x3. CN II through XII are grossly intact with appropriate affect      Allergies:  No Known Allergies    Medication Review:     Current Outpatient Medications:   •  ALPRAZolam (XANAX) 0.25 MG tablet, Take 0.25 mg by mouth At Night As Needed for Anxiety., Disp: , Rfl:   •  amLODIPine (NORVASC) 5 MG tablet, Take 5 mg by mouth Daily., Disp: , Rfl:   •  aspirin 81 MG chewable tablet, Chew 81 mg Daily., Disp: , Rfl:   •  carvedilol (COREG) 12.5 MG tablet, Take 12.5 mg by mouth 2 (Two) Times a Day With Meals., Disp: , Rfl:   •  DULoxetine (CYMBALTA) 30 MG capsule, Take 30 mg by mouth Daily., Disp: , Rfl:   •  hydrochlorothiazide " (HYDRODIURIL) 12.5 MG tablet, Take 12.5 mg by mouth Daily., Disp: , Rfl:   •  losartan (COZAAR) 100 MG tablet, TAKE 1 TABLET BY MOUTH EVERY DAY, Disp: 90 tablet, Rfl: 1  •  simvastatin (ZOCOR) 10 MG tablet, Take 10 mg by mouth Every Night., Disp: , Rfl:   •  XARELTO 20 MG tablet, TAKE 1 TABLET BY MOUTH DAILY WITH DINNER, Disp: 30 tablet, Rfl: 3    Family History:  Family History   Problem Relation Age of Onset   • COPD Mother    • Stroke Father        Past Medical History:  Past Medical History:   Diagnosis Date   • Anxiety    • Atrial fibrillation (CMS/HCC)    • Hyperlipidemia    • Hypertension    • Obesity (BMI 30-39.9) 6/23/2020   • Peripheral arterial disease (CMS/HCC)    • Seizure disorder (CMS/HCC)        Past surgical History:  Past Surgical History:   Procedure Laterality Date   • APPENDECTOMY     • CHOLECYSTECTOMY     • COLONOSCOPY     • TONSILLECTOMY     • TOTAL ABDOMINAL HYSTERECTOMY     • TOTAL KNEE ARTHROPLASTY Bilateral        Social History:  Social History     Socioeconomic History   • Marital status:      Spouse name: Not on file   • Number of children: Not on file   • Years of education: Not on file   • Highest education level: Not on file   Tobacco Use   • Smoking status: Never Smoker   • Smokeless tobacco: Never Used   Substance and Sexual Activity   • Alcohol use: No     Frequency: Never   • Drug use: No       Review of Systems:  The following systems were reviewed as they relate to the cardiovascular system: Constitutional, Eyes, ENT, Cardiovascular, Respiratory, Gastrointestinal, Integumentary, Neurological, Psychiatric, Hematologic, Endocrine, Musculoskeletal, and Genitourinary. The pertinent cardiovascular findings are reported above with all other cardiovascular points within those systems being negative.    Diagnostic Study Review:     Current Electrocardiogram:    ECG 12 Lead    Date/Time: 11/3/2020 3:11 PM  Performed by: Cecilia Caputo MD  Authorized by: Patito  Cecilia SARAVIA MD   Comparison: compared with previous ECG   Similar to previous ECG  Rhythm: sinus rhythm and sinus bradycardia  Rate: normal  BPM: 59  Conduction: conduction normal  QRS axis: normal  Other findings: non-specific ST-T wave changes    Clinical impression: abnormal EKG              NOTE: The following portions of the patient's history were reviewed and updated this visit as appropriate: allergies, current medications, past family history, past medical history, past social history, past surgical history and problem list.

## 2020-12-18 RX ORDER — RIVAROXABAN 20 MG/1
TABLET, FILM COATED ORAL
Qty: 30 TABLET | Refills: 3 | Status: SHIPPED | OUTPATIENT
Start: 2020-12-18 | End: 2021-05-04

## 2021-04-05 RX ORDER — LOSARTAN POTASSIUM 100 MG/1
TABLET ORAL
Qty: 90 TABLET | Refills: 0 | Status: SHIPPED | OUTPATIENT
Start: 2021-04-05 | End: 2021-06-22

## 2021-05-04 ENCOUNTER — OFFICE VISIT (OUTPATIENT)
Dept: CARDIOLOGY | Facility: CLINIC | Age: 81
End: 2021-05-04

## 2021-05-04 VITALS
BODY MASS INDEX: 35.79 KG/M2 | SYSTOLIC BLOOD PRESSURE: 132 MMHG | DIASTOLIC BLOOD PRESSURE: 76 MMHG | WEIGHT: 228 LBS | HEART RATE: 56 BPM | HEIGHT: 67 IN | RESPIRATION RATE: 18 BRPM | OXYGEN SATURATION: 96 %

## 2021-05-04 DIAGNOSIS — R00.1 BRADYCARDIA: ICD-10-CM

## 2021-05-04 DIAGNOSIS — E78.2 MIXED HYPERLIPIDEMIA: ICD-10-CM

## 2021-05-04 DIAGNOSIS — I73.9 PAD (PERIPHERAL ARTERY DISEASE) (HCC): ICD-10-CM

## 2021-05-04 DIAGNOSIS — I48.0 AF (PAROXYSMAL ATRIAL FIBRILLATION) (HCC): ICD-10-CM

## 2021-05-04 DIAGNOSIS — E78.5 DYSLIPIDEMIA: Chronic | ICD-10-CM

## 2021-05-04 DIAGNOSIS — I10 ESSENTIAL HYPERTENSION: Primary | Chronic | ICD-10-CM

## 2021-05-04 PROCEDURE — 93000 ELECTROCARDIOGRAM COMPLETE: CPT | Performed by: INTERNAL MEDICINE

## 2021-05-04 PROCEDURE — 99214 OFFICE O/P EST MOD 30 MIN: CPT | Performed by: INTERNAL MEDICINE

## 2021-05-04 RX ORDER — ATORVASTATIN CALCIUM 10 MG/1
10 TABLET, FILM COATED ORAL DAILY
Qty: 90 TABLET | Refills: 3 | Status: SHIPPED | OUTPATIENT
Start: 2021-05-04 | End: 2022-04-25

## 2021-05-04 NOTE — PROGRESS NOTES
Cardiology Office Visit      Encounter Date:  05/04/2021    Patient ID:   Susan Garcia is a 80 y.o. female.    Reason For Followup:  Hypertension  Hyperlipidemia  Recent hospitalization for atrial fibrillation    Brief Clinical History:  Dear Dr. Humphries, Adrienne Rice MD    I had the pleasure of seeing Susan Garcia today. As you are well aware, this is a 80 y.o. female past medical history there is significant for history of hypertension hyperlipidemia and also peripheral vascular disease here for follow-up for recent hospitalization for atrial fibrillation      Interval History:  Patient denies any further episodes of atrial fibrillation and also says that she is under a lot of stress at home is probably what contributed to atrial fibrillation  Denies any chest pain shortness of breath dizziness or syncope    Assessment & Plan    Impressions:  1.  hypertension:  patient was initially seen and evaluated for poorly controlled hypertension patient did have some extensive workup for secondary hypertension all those were negative and patient is currently doing better blood pressure is well controlled with the current treatment.   patient had a prior renal CT angiogram showing no evidence of any renal artery stenosis   continue home blood pressure monitoring  Blood pressure is controlled with current therapy     2.  hyperlipidemia:  patient is currently on statin.      3.  Mild renal insufficiency, secondary to hypertensive nephropathy.  Continue close monitoring on renal function, follow-up with Nephrology    4.  Peripheral arterial disease, carotid ultrasound with less than 50% stenosis    5.  Paroxysmal atrial fibrillation currently in sinus rhythm, unable to take the metoprolol secondary to side effects patient is back on Coreg and tolerating it well    Patient is currently off anticoagulation therapy  Benefits and alternatives reviewed and discussed with patient  Recent hospitalization with a stress test and  "echocardiogram with no significant findings  Stress test with fixed perfusion defect likely attenuation artifact  Echocardiogram with normal LV systolic function    Recommendations:  Holter monitor with no further evidence of atrial fibrillation  Patient does not want take anticoagulation she is off Xarelto  Close monitoring for any recurrence of atrial fibrillation  Risk benefits and alternatives reviewed and discussed with the patient  Continue close monitoring  Continue follow-up with nephrology  Follow-up in office in 6 months    Objective:    Vitals:  Vitals:    05/04/21 1057   BP: 132/76   BP Location: Left arm   Pulse: 56   Resp: 18   SpO2: 96%   Weight: 103 kg (228 lb)   Height: 170.2 cm (67\")       Physical Exam:    General: Alert, cooperative, no distress, appears stated age  Head:  Normocephalic, atraumatic, mucous membranes moist  Eyes:  Conjunctiva/corneas clear, EOM's intact     Neck:  Supple,  no adenopathy;      Lungs: Clear to auscultation bilaterally, no wheezes rhonchi rales are noted  Chest wall: No tenderness  Heart::  Regular rate and rhythm, S1 and S2 normal, no murmur, rub or gallop  Abdomen: Soft, non-tender, nondistended bowel sounds active  Extremities: No cyanosis, clubbing, or edema  Pulses: 2+ and symmetric all extremities  Skin:  No rashes or lesions  Neuro/psych: A&O x3. CN II through XII are grossly intact with appropriate affect      Allergies:  No Known Allergies    Medication Review:     Current Outpatient Medications:   •  amLODIPine (NORVASC) 10 MG tablet, Take 10 mg by mouth Daily., Disp: , Rfl:   •  aspirin 81 MG chewable tablet, Chew 81 mg Daily., Disp: , Rfl:   •  carvedilol (COREG) 12.5 MG tablet, Take 12.5 mg by mouth 2 (Two) Times a Day With Meals., Disp: , Rfl:   •  DULoxetine (CYMBALTA) 30 MG capsule, Take 30 mg by mouth Daily., Disp: , Rfl:   •  hydrochlorothiazide (HYDRODIURIL) 12.5 MG tablet, Take 12.5 mg by mouth Daily., Disp: , Rfl:   •  losartan (COZAAR) 100 MG " tablet, TAKE 1 TABLET BY MOUTH EVERY DAY, Disp: 90 tablet, Rfl: 0  •  simvastatin (ZOCOR) 10 MG tablet, Take 10 mg by mouth Every Night., Disp: , Rfl:     Family History:  Family History   Problem Relation Age of Onset   • COPD Mother    • Stroke Father        Past Medical History:  Past Medical History:   Diagnosis Date   • Anxiety    • Atrial fibrillation (CMS/HCC)    • Hyperlipidemia    • Hypertension    • Obesity (BMI 30-39.9) 6/23/2020   • Peripheral arterial disease (CMS/HCC)    • Seizure disorder (CMS/HCC)        Past surgical History:  Past Surgical History:   Procedure Laterality Date   • APPENDECTOMY     • CHOLECYSTECTOMY     • COLONOSCOPY     • TONSILLECTOMY     • TOTAL ABDOMINAL HYSTERECTOMY     • TOTAL KNEE ARTHROPLASTY Bilateral        Social History:  Social History     Socioeconomic History   • Marital status:      Spouse name: Not on file   • Number of children: Not on file   • Years of education: Not on file   • Highest education level: Not on file   Tobacco Use   • Smoking status: Never Smoker   • Smokeless tobacco: Never Used   Vaping Use   • Vaping Use: Never used   Substance and Sexual Activity   • Alcohol use: No   • Drug use: No   • Sexual activity: Defer       Review of Systems:  The following systems were reviewed as they relate to the cardiovascular system: Constitutional, Eyes, ENT, Cardiovascular, Respiratory, Gastrointestinal, Integumentary, Neurological, Psychiatric, Hematologic, Endocrine, Musculoskeletal, and Genitourinary. The pertinent cardiovascular findings are reported above with all other cardiovascular points within those systems being negative.    Diagnostic Study Review:     Current Electrocardiogram:    ECG 12 Lead    Date/Time: 5/4/2021 12:21 PM  Performed by: Cecilia Caputo MD  Authorized by: Cecilia Caputo MD   Comparison: compared with previous ECG   Similar to previous ECG  Rhythm: sinus rhythm and sinus bradycardia  Rate: normal  BPM:  56  Conduction: conduction normal  QRS axis: normal  Other findings: non-specific ST-T wave changes    Clinical impression: abnormal EKG              NOTE: The following portions of the patient's history were reviewed and updated this visit as appropriate: allergies, current medications, past family history, past medical history, past social history, past surgical history and problem list.

## 2021-06-22 RX ORDER — LOSARTAN POTASSIUM 100 MG/1
TABLET ORAL
Qty: 90 TABLET | Refills: 1 | Status: SHIPPED | OUTPATIENT
Start: 2021-06-22 | End: 2021-12-20

## 2021-11-30 ENCOUNTER — OFFICE VISIT (OUTPATIENT)
Dept: CARDIOLOGY | Facility: CLINIC | Age: 81
End: 2021-11-30

## 2021-11-30 VITALS
WEIGHT: 231 LBS | BODY MASS INDEX: 36.26 KG/M2 | OXYGEN SATURATION: 98 % | DIASTOLIC BLOOD PRESSURE: 84 MMHG | SYSTOLIC BLOOD PRESSURE: 155 MMHG | RESPIRATION RATE: 18 BRPM | HEART RATE: 68 BPM | HEIGHT: 67 IN

## 2021-11-30 DIAGNOSIS — E78.5 DYSLIPIDEMIA: Chronic | ICD-10-CM

## 2021-11-30 DIAGNOSIS — I10 ESSENTIAL HYPERTENSION: Primary | Chronic | ICD-10-CM

## 2021-11-30 DIAGNOSIS — I73.9 PAD (PERIPHERAL ARTERY DISEASE) (HCC): ICD-10-CM

## 2021-11-30 DIAGNOSIS — E78.2 MIXED HYPERLIPIDEMIA: ICD-10-CM

## 2021-11-30 PROCEDURE — 99214 OFFICE O/P EST MOD 30 MIN: CPT | Performed by: INTERNAL MEDICINE

## 2021-11-30 PROCEDURE — 93000 ELECTROCARDIOGRAM COMPLETE: CPT | Performed by: INTERNAL MEDICINE

## 2021-11-30 NOTE — PROGRESS NOTES
Cardiology Office Visit      Encounter Date:  11/30/2021    Patient ID:   Susan Garcia is a 81 y.o. female.      Reason For Followup:  Hypertension  Hyperlipidemia  Paroxysmal atrial fibrillation    Brief Clinical History:  Dear Dr. Humphries, Adrienne Rice MD    I had the pleasure of seeing Susan Garcia today. As you are well aware, this is a 81 y.o. female past medical history there is significant for history of hypertension hyperlipidemia and also peripheral vascular disease here for follow-up       Interval History:  Patient denies any further episodes of atrial fibrillation and also says that she is under a lot of stress at home is probably what contributed to atrial fibrillation  Denies any chest pain shortness of breath dizziness or syncope  Denies any further symptoms of palpitations fluttering dizziness or syncope  Assessment & Plan    Impressions:  1.  hypertension:  patient was initially seen and evaluated for poorly controlled hypertension patient did have some extensive workup for secondary hypertension all those were negative and patient is currently doing better blood pressure is well controlled with the current treatment.   patient had a prior renal CT angiogram showing no evidence of any renal artery stenosis   continue home blood pressure monitoring  Blood pressure is controlled with current therapy     2.  hyperlipidemia:  patient is currently on statin.      3.  Mild renal insufficiency, secondary to hypertensive nephropathy.  Continue close monitoring on renal function, follow-up with Nephrology    4.  Peripheral arterial disease, carotid ultrasound with less than 50% stenosis    5.  Paroxysmal atrial fibrillation currently in sinus rhythm, unable to take the metoprolol secondary to side effects patient is back on Coreg and tolerating it well    Patient is currently off anticoagulation therapy  Benefits and alternatives reviewed and discussed with patient  Recent hospitalization with a  "stress test and echocardiogram with no significant findings  Stress test with fixed perfusion defect likely attenuation artifact  Echocardiogram with normal LV systolic function    Recommendations:  Holter monitor with no further evidence of atrial fibrillation  Patient does not want take anticoagulation she is off Xarelto  Close monitoring for any recurrence of atrial fibrillation  Risk benefits and alternatives reviewed and discussed with the patient  Continue close monitoring  Continue follow-up with nephrology  Labs with primary care physician office  Need for continued aggressive risk factor modification close monitoring of blood pressure reviewed and discussed with patient  Follow-up with primary care physician and nephrology  Continue current medical therapy  Follow-up in office in 6 months    Objective:    Vitals:  Vitals:    11/30/21 0815   BP: 155/84   BP Location: Left arm   Patient Position: Sitting   Cuff Size: Large Adult   Pulse: 68   Resp: 18   SpO2: 98%   Weight: 105 kg (231 lb)   Height: 170.2 cm (67\")       Physical Exam:    General: Alert, cooperative, no distress, appears stated age  Head:  Normocephalic, atraumatic, mucous membranes moist  Eyes:  Conjunctiva/corneas clear, EOM's intact     Neck:  Supple,  no adenopathy;      Lungs: Clear to auscultation bilaterally, no wheezes rhonchi rales are noted  Chest wall: No tenderness  Heart::  Regular rate and rhythm, S1 and S2 normal, no murmur, rub or gallop  Abdomen: Soft, non-tender, nondistended bowel sounds active  Extremities: No cyanosis, clubbing, or edema  Pulses: 2+ and symmetric all extremities  Skin:  No rashes or lesions  Neuro/psych: A&O x3. CN II through XII are grossly intact with appropriate affect      Allergies:  No Known Allergies    Medication Review:     Current Outpatient Medications:   •  amLODIPine (NORVASC) 10 MG tablet, Take 10 mg by mouth Daily., Disp: , Rfl:   •  aspirin 81 MG chewable tablet, Chew 81 mg Daily., Disp: , " Rfl:   •  atorvastatin (LIPITOR) 10 MG tablet, Take 1 tablet by mouth Daily., Disp: 90 tablet, Rfl: 3  •  carvedilol (COREG) 12.5 MG tablet, Take 12.5 mg by mouth 2 (Two) Times a Day With Meals., Disp: , Rfl:   •  DULoxetine (CYMBALTA) 30 MG capsule, Take 30 mg by mouth Daily., Disp: , Rfl:   •  hydrochlorothiazide (HYDRODIURIL) 12.5 MG tablet, Take 12.5 mg by mouth Daily., Disp: , Rfl:   •  losartan (COZAAR) 100 MG tablet, TAKE 1 TABLET BY MOUTH EVERY DAY, Disp: 90 tablet, Rfl: 1    Family History:  Family History   Problem Relation Age of Onset   • COPD Mother    • Stroke Father        Past Medical History:  Past Medical History:   Diagnosis Date   • Anxiety    • Atrial fibrillation (HCC)    • Hyperlipidemia    • Hypertension    • Obesity (BMI 30-39.9) 6/23/2020   • Peripheral arterial disease (HCC)    • Seizure disorder (HCC)        Past surgical History:  Past Surgical History:   Procedure Laterality Date   • APPENDECTOMY     • CHOLECYSTECTOMY     • COLONOSCOPY     • TONSILLECTOMY     • TOTAL ABDOMINAL HYSTERECTOMY     • TOTAL KNEE ARTHROPLASTY Bilateral        Social History:  Social History     Socioeconomic History   • Marital status:    Tobacco Use   • Smoking status: Never Smoker   • Smokeless tobacco: Never Used   Vaping Use   • Vaping Use: Never used   Substance and Sexual Activity   • Alcohol use: No   • Drug use: No   • Sexual activity: Defer       Review of Systems:  The following systems were reviewed as they relate to the cardiovascular system: Constitutional, Eyes, ENT, Cardiovascular, Respiratory, Gastrointestinal, Integumentary, Neurological, Psychiatric, Hematologic, Endocrine, Musculoskeletal, and Genitourinary. The pertinent cardiovascular findings are reported above with all other cardiovascular points within those systems being negative.    Diagnostic Study Review:     Current Electrocardiogram:    ECG 12 Lead    Date/Time: 11/30/2021 8:43 AM  Performed by: Cecilia Caputo,  MD  Authorized by: Cecilia Caputo MD   Comparison: compared with previous ECG   Similar to previous ECG  Rhythm: sinus rhythm  Rate: normal  BPM: 67  Conduction: conduction normal  ST Segments: ST segments normal  T Waves: T waves normal  QRS axis: normal    Clinical impression: normal ECG              NOTE: The following portions of the patient's history were reviewed and updated this visit as appropriate: allergies, current medications, past family history, past medical history, past social history, past surgical history and problem list.

## 2021-12-20 RX ORDER — LOSARTAN POTASSIUM 100 MG/1
TABLET ORAL
Qty: 90 TABLET | Refills: 1 | Status: SHIPPED | OUTPATIENT
Start: 2021-12-20 | End: 2022-06-28

## 2022-04-25 RX ORDER — ATORVASTATIN CALCIUM 10 MG/1
10 TABLET, FILM COATED ORAL DAILY
Qty: 90 TABLET | Refills: 3 | Status: SHIPPED | OUTPATIENT
Start: 2022-04-25

## 2022-06-28 RX ORDER — LOSARTAN POTASSIUM 100 MG/1
TABLET ORAL
Qty: 90 TABLET | Refills: 1 | Status: SHIPPED | OUTPATIENT
Start: 2022-06-28 | End: 2022-12-28

## 2022-09-08 ENCOUNTER — OFFICE VISIT (OUTPATIENT)
Dept: CARDIOLOGY | Facility: CLINIC | Age: 82
End: 2022-09-08

## 2022-09-08 VITALS
SYSTOLIC BLOOD PRESSURE: 153 MMHG | RESPIRATION RATE: 18 BRPM | BODY MASS INDEX: 35.63 KG/M2 | HEART RATE: 65 BPM | OXYGEN SATURATION: 96 % | DIASTOLIC BLOOD PRESSURE: 82 MMHG | HEIGHT: 67 IN | WEIGHT: 227 LBS

## 2022-09-08 DIAGNOSIS — I10 ESSENTIAL HYPERTENSION: Chronic | ICD-10-CM

## 2022-09-08 DIAGNOSIS — E78.2 MIXED HYPERLIPIDEMIA: ICD-10-CM

## 2022-09-08 DIAGNOSIS — R09.89 BILATERAL CAROTID BRUITS: ICD-10-CM

## 2022-09-08 DIAGNOSIS — E78.5 DYSLIPIDEMIA: Primary | Chronic | ICD-10-CM

## 2022-09-08 DIAGNOSIS — I73.9 PAD (PERIPHERAL ARTERY DISEASE): ICD-10-CM

## 2022-09-08 DIAGNOSIS — R00.1 BRADYCARDIA: ICD-10-CM

## 2022-09-08 PROCEDURE — 93000 ELECTROCARDIOGRAM COMPLETE: CPT | Performed by: INTERNAL MEDICINE

## 2022-09-08 PROCEDURE — 99214 OFFICE O/P EST MOD 30 MIN: CPT | Performed by: INTERNAL MEDICINE

## 2022-09-08 NOTE — PROGRESS NOTES
Cardiology Office Visit      Encounter Date:  09/08/2022    Patient ID:   Susan aGrcia is a 82 y.o. female.      Reason For Followup:  Hypertension  Hyperlipidemia  Paroxysmal atrial fibrillation    Brief Clinical History:  Dear Dr. Humphries, Adrienne Rice MD    I had the pleasure of seeing Susan Garcia today. As you are well aware, this is a 82 y.o. female past medical history there is significant for history of hypertension hyperlipidemia and also peripheral vascular disease here for follow-up       Interval History:  Patient denies any further episodes of atrial fibrillation and also says that she is under a lot of stress at home is probably what contributed to atrial fibrillation  Denies any chest pain shortness of breath dizziness or syncope  Denies any further symptoms of palpitations fluttering dizziness or syncope  Assessment & Plan    Impressions:  1.  hypertension:  patient was initially seen and evaluated for poorly controlled hypertension patient did have some extensive workup for secondary hypertension all those were negative and patient is currently doing better blood pressure is well controlled with the current treatment.   patient had a prior renal CT angiogram showing no evidence of any renal artery stenosis   continue home blood pressure monitoring  Blood pressure is controlled with current therapy     2.  hyperlipidemia:  patient is currently on statin.      3.  Mild renal insufficiency, secondary to hypertensive nephropathy.  Continue close monitoring on renal function, follow-up with Nephrology    4.  Peripheral arterial disease, carotid ultrasound with less than 50% stenosis/recent repeat screening vascular ultrasound study with a mild carotid stenosis bilaterally    5.  Paroxysmal atrial fibrillation currently in sinus rhythm, unable to take the metoprolol secondary to side effects patient is back on Coreg and tolerating it well    Patient is currently off anticoagulation  "therapy  Benefits and alternatives reviewed and discussed with patient  Recent hospitalization with a stress test and echocardiogram with no significant findings  Stress test with fixed perfusion defect likely attenuation artifact  Echocardiogram with normal LV systolic function    Recommendations:  Holter monitor with no further evidence of atrial fibrillation  Patient does not want take anticoagulation she is off Xarelto  Close monitoring for any recurrence of atrial fibrillation  Risk benefits and alternatives reviewed and discussed with the patient  Continue close monitoring  Continue follow-up with nephrology  Labs with primary care physician office  Need for continued aggressive risk factor modification close monitoring of blood pressure reviewed and discussed with patient  Follow-up with primary care physician and nephrology  Continue current medical therapy  Recent screening ultrasound study and labs reviewed and discussed with the patient  Lipids are optimal  Continue current medical therapy with Norvasc 10 mg p.o. once a day aspirin 81 mg p.o. once a day Lipitor 10 mg p.o. once a day Coreg 12.5 mg p.o. twice daily losartan 100 mg p.o. once a day  Follow-up in office in 6 months      Objective:    Vitals:  Vitals:    09/08/22 0942   BP: 153/82   BP Location: Left arm   Patient Position: Sitting   Cuff Size: Large Adult   Pulse: 65   Resp: 18   SpO2: 96%   Weight: 103 kg (227 lb)   Height: 170.2 cm (67\")       Physical Exam:    General: Alert, cooperative, no distress, appears stated age  Head:  Normocephalic, atraumatic, mucous membranes moist  Eyes:  Conjunctiva/corneas clear, EOM's intact     Neck:  Supple,  no adenopathy;      Lungs: Clear to auscultation bilaterally, no wheezes rhonchi rales are noted  Chest wall: No tenderness  Heart::  Regular rate and rhythm, S1 and S2 normal, no murmur, rub or gallop  Abdomen: Soft, non-tender, nondistended bowel sounds active  Extremities: No cyanosis, clubbing, or " edema  Pulses: 2+ and symmetric all extremities  Skin:  No rashes or lesions  Neuro/psych: A&O x3. CN II through XII are grossly intact with appropriate affect      Allergies:  No Known Allergies    Medication Review:     Current Outpatient Medications:   •  amLODIPine (NORVASC) 10 MG tablet, Take 10 mg by mouth Daily., Disp: , Rfl:   •  aspirin 81 MG chewable tablet, Chew 81 mg Daily., Disp: , Rfl:   •  atorvastatin (LIPITOR) 10 MG tablet, TAKE 1 TABLET BY MOUTH DAILY, Disp: 90 tablet, Rfl: 3  •  carvedilol (COREG) 12.5 MG tablet, Take 12.5 mg by mouth 2 (Two) Times a Day With Meals., Disp: , Rfl:   •  DULoxetine (CYMBALTA) 30 MG capsule, Take 30 mg by mouth Daily., Disp: , Rfl:   •  hydrochlorothiazide (HYDRODIURIL) 12.5 MG tablet, Take 12.5 mg by mouth Daily., Disp: , Rfl:   •  losartan (COZAAR) 100 MG tablet, TAKE 1 TABLET BY MOUTH EVERY DAY, Disp: 90 tablet, Rfl: 1    Family History:  Family History   Problem Relation Age of Onset   • COPD Mother    • Stroke Father        Past Medical History:  Past Medical History:   Diagnosis Date   • Anxiety    • Atrial fibrillation (HCC)    • Chronic kidney disease    • Hyperlipidemia    • Hypertension    • Obesity (BMI 30-39.9) 06/23/2020   • Peripheral arterial disease (HCC)    • Seizure disorder (HCC)        Past surgical History:  Past Surgical History:   Procedure Laterality Date   • APPENDECTOMY     • CHOLECYSTECTOMY     • COLONOSCOPY     • TONSILLECTOMY     • TOTAL ABDOMINAL HYSTERECTOMY     • TOTAL KNEE ARTHROPLASTY Bilateral        Social History:  Social History     Socioeconomic History   • Marital status:    Tobacco Use   • Smoking status: Never Smoker   • Smokeless tobacco: Never Used   Vaping Use   • Vaping Use: Never used   Substance and Sexual Activity   • Alcohol use: No   • Drug use: No   • Sexual activity: Defer       Review of Systems:  The following systems were reviewed as they relate to the cardiovascular system: Constitutional, Eyes, ENT,  Cardiovascular, Respiratory, Gastrointestinal, Integumentary, Neurological, Psychiatric, Hematologic, Endocrine, Musculoskeletal, and Genitourinary. The pertinent cardiovascular findings are reported above with all other cardiovascular points within those systems being negative.    Diagnostic Study Review:     Current Electrocardiogram:    ECG 12 Lead    Date/Time: 9/8/2022 10:01 AM  Performed by: Cecilia Caputo MD  Authorized by: Cecilia Caputo MD   Comparison: compared with previous ECG   Similar to previous ECG  Rhythm: sinus rhythm  Rate: normal  BPM: 65  Conduction: right bundle branch block  QRS axis: normal                NOTE: The following portions of the patient's history were reviewed and updated this visit as appropriate: allergies, current medications, past family history, past medical history, past social history, past surgical history and problem list.

## 2022-12-28 RX ORDER — LOSARTAN POTASSIUM 100 MG/1
TABLET ORAL
Qty: 90 TABLET | Refills: 1 | Status: SHIPPED | OUTPATIENT
Start: 2022-12-28

## 2023-02-21 ENCOUNTER — TELEPHONE (OUTPATIENT)
Dept: CARDIOLOGY | Facility: CLINIC | Age: 83
End: 2023-02-21

## 2023-02-21 DIAGNOSIS — I77.1 SUBCLAVIAN ARTERIAL STENOSIS: Primary | ICD-10-CM

## 2023-02-21 NOTE — TELEPHONE ENCOUNTER
Caller: Susan Garcia     Relationship: SELF    Best call back number: [phone]    What is your medical concern?   PT HAD A CAROTID DOPPLER AND FOUND R SIDE STENOSIS AND 95% BLOCKAGE - DR KUO ALSO ORDERED CT AND CONFIRMED BLOCKAGE - PT REPORTS SWELLING IN LEFT ANKLE AND WANTED TO LET OFFICE KNOW AS WELL - PT SEEKING ADVISEMENT ON IF SOONER APPT NEEDED - PT CURRENTLY SCHEDULED FOR 03.09.2023

## 2023-02-24 RX ORDER — METHOCARBAMOL 500 MG/1
TABLET, FILM COATED ORAL
COMMUNITY
Start: 2023-01-27 | End: 2023-03-09 | Stop reason: ALTCHOICE

## 2023-03-09 ENCOUNTER — OFFICE VISIT (OUTPATIENT)
Dept: CARDIOLOGY | Facility: CLINIC | Age: 83
End: 2023-03-09
Payer: MEDICARE

## 2023-03-09 VITALS
HEART RATE: 54 BPM | HEIGHT: 67 IN | OXYGEN SATURATION: 98 % | BODY MASS INDEX: 35.31 KG/M2 | DIASTOLIC BLOOD PRESSURE: 62 MMHG | SYSTOLIC BLOOD PRESSURE: 132 MMHG | WEIGHT: 225 LBS

## 2023-03-09 DIAGNOSIS — R09.89 BILATERAL CAROTID BRUITS: ICD-10-CM

## 2023-03-09 DIAGNOSIS — I77.1 SUBCLAVIAN ARTERIAL STENOSIS: Primary | ICD-10-CM

## 2023-03-09 DIAGNOSIS — I10 ESSENTIAL HYPERTENSION: Chronic | ICD-10-CM

## 2023-03-09 DIAGNOSIS — E78.5 DYSLIPIDEMIA: Chronic | ICD-10-CM

## 2023-03-09 DIAGNOSIS — I73.9 PAD (PERIPHERAL ARTERY DISEASE): ICD-10-CM

## 2023-03-09 DIAGNOSIS — E78.2 MIXED HYPERLIPIDEMIA: ICD-10-CM

## 2023-03-09 DIAGNOSIS — R00.1 BRADYCARDIA: ICD-10-CM

## 2023-03-09 PROCEDURE — 99214 OFFICE O/P EST MOD 30 MIN: CPT | Performed by: INTERNAL MEDICINE

## 2023-03-09 PROCEDURE — 93000 ELECTROCARDIOGRAM COMPLETE: CPT | Performed by: INTERNAL MEDICINE

## 2023-03-09 NOTE — PROGRESS NOTES
Cardiology Office Visit      Encounter Date:  03/09/2023    Patient ID:   Susan Garcia is a 82 y.o. female.        Reason For Followup:  Hypertension  Hyperlipidemia  Significant symptomatic right subclavian stenosis    Brief Clinical History:  Dear Dr. Humphries, Adrienne Rice MD    I had the pleasure of seeing Susan Garcia today. As you are well aware, this is a 82 y.o. female past medical history there is significant for history of hypertension hyperlipidemia and also peripheral vascular disease here for follow-up       Interval History:  Patient denies any further episodes of atrial fibrillation and also says that she is under a lot of stress at home is probably what contributed to atrial fibrillation  Denies any chest pain shortness of breath dizziness or syncope  Denies any further symptoms of palpitations fluttering dizziness or syncope  Recent diagnosis of cyst severe right subclavian stenosis with symptoms    Assessment & Plan    Impressions:  1.  hypertension:  patient was initially seen and evaluated for poorly controlled hypertension patient did have some extensive workup for secondary hypertension all those were negative and patient is currently doing better blood pressure is well controlled with the current treatment.   patient had a prior renal CT angiogram showing no evidence of any renal artery stenosis   continue home blood pressure monitoring  Blood pressure is controlled with current therapy     2.  hyperlipidemia:  patient is currently on statin.      3.  Mild renal insufficiency, secondary to hypertensive nephropathy.  Continue close monitoring on renal function, follow-up with Nephrology    4.  Peripheral arterial disease, carotid ultrasound with less than 50% stenosis/recent repeat screening vascular ultrasound study with a mild carotid stenosis bilaterally    5.  Paroxysmal atrial fibrillation currently in sinus rhythm, unable to take the metoprolol secondary to side effects patient is  "back on Coreg and tolerating it well    Severe stenosis involving the right subclavian artery with symptoms  Critical stenosis involving the right subclavian artery  Moderate carotid stenosis involving bilateral carotid arteries  Significant blood pressure difference between the brachial systolic pressure between the right and left side    Patient is currently off anticoagulation therapy  Risk benefits and alternatives reviewed and discussed with patient  Recent hospitalization with a stress test and echocardiogram with no significant findings  Stress test with fixed perfusion defect likely attenuation artifact  Echocardiogram with normal LV systolic function    Recommendations:  Holter monitor with no further evidence of atrial fibrillation  Patient does not want take anticoagulation she is off Xarelto  Close monitoring for any recurrence of atrial fibrillation  Risk benefits and alternatives reviewed and discussed with the patient  Continue close monitoring  Continue follow-up with nephrology  Labs with primary care physician office  Need for continued aggressive risk factor modification close monitoring of blood pressure reviewed and discussed with patient  Follow-up with primary care physician and nephrology  Continue current medical therapy  Lipids are optimal  Continue current medical therapy with Norvasc 10 mg p.o. once a day aspirin 81 mg p.o. once a day Lipitor 10 mg p.o. once a day Coreg 12.5 mg p.o. twice daily losartan 100 mg p.o. once a day  Follow-up with vascular surgery for the subclavian stenosis  Follow-up in office in 6 months      Objective:    Vitals:  Vitals:    03/09/23 1047   BP: 132/62   Pulse: 54   SpO2: 98%   Weight: 102 kg (225 lb)   Height: 170.2 cm (67\")       Physical Exam:    General: Alert, cooperative, no distress, appears stated age  Head:  Normocephalic, atraumatic, mucous membranes moist  Eyes:  Conjunctiva/corneas clear, EOM's intact     Neck:  Supple,  no adenopathy;    "   Lungs: Clear to auscultation bilaterally, no wheezes rhonchi rales are noted  Chest wall: No tenderness  Heart::  Regular rate and rhythm, S1 and S2 normal, no murmur, rub or gallop  Abdomen: Soft, non-tender, nondistended bowel sounds active  Extremities: No cyanosis, clubbing, or edema  Pulses: 2+ and symmetric all extremities  Skin:  No rashes or lesions  Neuro/psych: A&O x3. CN II through XII are grossly intact with appropriate affect      Allergies:  No Known Allergies    Medication Review:     Current Outpatient Medications:   •  amLODIPine (NORVASC) 10 MG tablet, Take 10 mg by mouth Daily., Disp: , Rfl:   •  aspirin 81 MG chewable tablet, Chew 81 mg Daily., Disp: , Rfl:   •  atorvastatin (LIPITOR) 10 MG tablet, TAKE 1 TABLET BY MOUTH DAILY, Disp: 90 tablet, Rfl: 3  •  carvedilol (COREG) 12.5 MG tablet, Take 12.5 mg by mouth 2 (Two) Times a Day With Meals., Disp: , Rfl:   •  DULoxetine (CYMBALTA) 30 MG capsule, Take 30 mg by mouth Daily., Disp: , Rfl:   •  hydrochlorothiazide (HYDRODIURIL) 12.5 MG tablet, Take 12.5 mg by mouth Daily., Disp: , Rfl:   •  losartan (COZAAR) 100 MG tablet, TAKE 1 TABLET BY MOUTH EVERY DAY, Disp: 90 tablet, Rfl: 1    Family History:  Family History   Problem Relation Age of Onset   • COPD Mother    • Stroke Father        Past Medical History:  Past Medical History:   Diagnosis Date   • Abnormal ECG    • Anxiety    • Atrial fibrillation (HCC)    • Chronic kidney disease    • Hyperlipidemia    • Hypertension    • Obesity (BMI 30-39.9) 06/23/2020   • Peripheral arterial disease (HCC)    • Seizure disorder (HCC)        Past surgical History:  Past Surgical History:   Procedure Laterality Date   • APPENDECTOMY     • CHOLECYSTECTOMY     • COLONOSCOPY     • TONSILLECTOMY     • TOTAL ABDOMINAL HYSTERECTOMY     • TOTAL KNEE ARTHROPLASTY Bilateral        Social History:  Social History     Socioeconomic History   • Marital status:    Tobacco Use   • Smoking status: Never   •  Smokeless tobacco: Never   Vaping Use   • Vaping Use: Never used   Substance and Sexual Activity   • Alcohol use: No   • Drug use: No   • Sexual activity: Not Currently       Review of Systems:  The following systems were reviewed as they relate to the cardiovascular system: Constitutional, Eyes, ENT, Cardiovascular, Respiratory, Gastrointestinal, Integumentary, Neurological, Psychiatric, Hematologic, Endocrine, Musculoskeletal, and Genitourinary. The pertinent cardiovascular findings are reported above with all other cardiovascular points within those systems being negative.    Diagnostic Study Review:     Current Electrocardiogram:    ECG 12 Lead    Date/Time: 3/9/2023 12:47 PM  Performed by: Cecilia Caputo MD  Authorized by: Cecilia Caputo MD   Comparison: compared with previous ECG   Similar to previous ECG  Rhythm: sinus rhythm and sinus bradycardia  Rate: normal  BPM: 53  Conduction: right bundle branch block  QRS axis: normal  Other findings: non-specific ST-T wave changes    Clinical impression: abnormal EKG              NOTE: The following portions of the patient's history were reviewed and updated this visit as appropriate: allergies, current medications, past family history, past medical history, past social history, past surgical history and problem list.

## 2023-03-14 ENCOUNTER — APPOINTMENT (OUTPATIENT)
Dept: VASCULAR SURGERY | Facility: HOSPITAL | Age: 83
End: 2023-03-14
Payer: MEDICARE

## 2023-03-14 PROCEDURE — G0463 HOSPITAL OUTPT CLINIC VISIT: HCPCS

## 2023-04-18 ENCOUNTER — APPOINTMENT (OUTPATIENT)
Dept: VASCULAR SURGERY | Facility: HOSPITAL | Age: 83
End: 2023-04-18
Payer: MEDICARE

## 2023-04-18 PROCEDURE — G0463 HOSPITAL OUTPT CLINIC VISIT: HCPCS

## 2023-04-26 DIAGNOSIS — I77.1 SUBCLAVIAN ARTERIAL STENOSIS: Primary | ICD-10-CM

## 2023-05-01 RX ORDER — ATORVASTATIN CALCIUM 10 MG/1
10 TABLET, FILM COATED ORAL DAILY
Qty: 90 TABLET | Refills: 3 | Status: SHIPPED | OUTPATIENT
Start: 2023-05-01

## 2023-05-23 PROBLEM — I77.1 SUBCLAVIAN ARTERIAL STENOSIS: Status: ACTIVE | Noted: 2023-05-23

## 2023-06-12 ENCOUNTER — LAB (OUTPATIENT)
Dept: LAB | Facility: HOSPITAL | Age: 83
End: 2023-06-12
Payer: MEDICARE

## 2023-06-12 ENCOUNTER — HOSPITAL ENCOUNTER (OUTPATIENT)
Dept: CARDIOLOGY | Facility: HOSPITAL | Age: 83
Discharge: HOME | End: 2023-06-12
Payer: MEDICARE

## 2023-06-12 LAB
ANION GAP SERPL CALCULATED.3IONS-SCNC: 10.1 MMOL/L (ref 5–15)
BUN SERPL-MCNC: 32 MG/DL (ref 8–23)
BUN/CREAT SERPL: 22.5 (ref 7–25)
CALCIUM SPEC-SCNC: 10.9 MG/DL (ref 8.6–10.5)
CHLORIDE SERPL-SCNC: 105 MMOL/L (ref 98–107)
CO2 SERPL-SCNC: 27.9 MMOL/L (ref 22–29)
CREAT SERPL-MCNC: 1.42 MG/DL (ref 0.57–1)
DEPRECATED RDW RBC AUTO: 53 FL (ref 37–54)
EGFRCR SERPLBLD CKD-EPI 2021: 37.5 ML/MIN/1.73
ERYTHROCYTE [DISTWIDTH] IN BLOOD BY AUTOMATED COUNT: 15.4 % (ref 12.3–15.4)
GLUCOSE SERPL-MCNC: 107 MG/DL (ref 65–99)
HCT VFR BLD AUTO: 36.1 % (ref 34–46.6)
HGB BLD-MCNC: 12.2 G/DL (ref 12–15.9)
MCH RBC QN AUTO: 32.1 PG (ref 26.6–33)
MCHC RBC AUTO-ENTMCNC: 33.8 G/DL (ref 31.5–35.7)
MCV RBC AUTO: 95 FL (ref 79–97)
PLATELET # BLD AUTO: 292 10*3/MM3 (ref 140–450)
PMV BLD AUTO: 10.2 FL (ref 6–12)
POTASSIUM SERPL-SCNC: 3.8 MMOL/L (ref 3.5–5.2)
RBC # BLD AUTO: 3.8 10*6/MM3 (ref 3.77–5.28)
SODIUM SERPL-SCNC: 143 MMOL/L (ref 136–145)
WBC NRBC COR # BLD: 11.27 10*3/MM3 (ref 3.4–10.8)

## 2023-06-12 PROCEDURE — 36415 COLL VENOUS BLD VENIPUNCTURE: CPT | Performed by: STUDENT IN AN ORGANIZED HEALTH CARE EDUCATION/TRAINING PROGRAM

## 2023-06-12 PROCEDURE — 85027 COMPLETE CBC AUTOMATED: CPT | Performed by: STUDENT IN AN ORGANIZED HEALTH CARE EDUCATION/TRAINING PROGRAM

## 2023-06-12 PROCEDURE — 93005 ELECTROCARDIOGRAM TRACING: CPT

## 2023-06-12 PROCEDURE — 93010 ELECTROCARDIOGRAM REPORT: CPT | Performed by: INTERNAL MEDICINE

## 2023-06-12 PROCEDURE — 80048 BASIC METABOLIC PNL TOTAL CA: CPT | Performed by: STUDENT IN AN ORGANIZED HEALTH CARE EDUCATION/TRAINING PROGRAM

## 2023-06-20 ENCOUNTER — HOSPITAL ENCOUNTER (OUTPATIENT)
Facility: HOSPITAL | Age: 83
Setting detail: HOSPITAL OUTPATIENT SURGERY
Discharge: HOME | End: 2023-06-20
Attending: STUDENT IN AN ORGANIZED HEALTH CARE EDUCATION/TRAINING PROGRAM | Admitting: STUDENT IN AN ORGANIZED HEALTH CARE EDUCATION/TRAINING PROGRAM
Payer: MEDICARE

## 2023-06-20 VITALS
BODY MASS INDEX: 36.16 KG/M2 | WEIGHT: 225 LBS | OXYGEN SATURATION: 91 % | TEMPERATURE: 97.1 F | RESPIRATION RATE: 12 BRPM | DIASTOLIC BLOOD PRESSURE: 54 MMHG | HEIGHT: 66 IN | HEART RATE: 54 BPM | SYSTOLIC BLOOD PRESSURE: 137 MMHG

## 2023-06-20 LAB
ABO GROUP BLD: NORMAL
BLD GP AB SCN SERPL QL: NEGATIVE
POTASSIUM SERPL-SCNC: 4.3 MMOL/L (ref 3.5–5.2)
RH BLD: POSITIVE
T&S EXPIRATION DATE: NORMAL

## 2023-06-20 PROCEDURE — C1725 CATH, TRANSLUMIN NON-LASER: HCPCS | Performed by: STUDENT IN AN ORGANIZED HEALTH CARE EDUCATION/TRAINING PROGRAM

## 2023-06-20 PROCEDURE — 0 LIDOCAINE 1 % SOLUTION: Performed by: STUDENT IN AN ORGANIZED HEALTH CARE EDUCATION/TRAINING PROGRAM

## 2023-06-20 PROCEDURE — 86901 BLOOD TYPING SEROLOGIC RH(D): CPT | Performed by: STUDENT IN AN ORGANIZED HEALTH CARE EDUCATION/TRAINING PROGRAM

## 2023-06-20 PROCEDURE — C1894 INTRO/SHEATH, NON-LASER: HCPCS | Performed by: STUDENT IN AN ORGANIZED HEALTH CARE EDUCATION/TRAINING PROGRAM

## 2023-06-20 PROCEDURE — 25010000002 HEPARIN (PORCINE) PER 1000 UNITS: Performed by: STUDENT IN AN ORGANIZED HEALTH CARE EDUCATION/TRAINING PROGRAM

## 2023-06-20 PROCEDURE — C1769 GUIDE WIRE: HCPCS | Performed by: STUDENT IN AN ORGANIZED HEALTH CARE EDUCATION/TRAINING PROGRAM

## 2023-06-20 PROCEDURE — 25510000001 IOPAMIDOL PER 1 ML: Performed by: STUDENT IN AN ORGANIZED HEALTH CARE EDUCATION/TRAINING PROGRAM

## 2023-06-20 PROCEDURE — 86850 RBC ANTIBODY SCREEN: CPT | Performed by: STUDENT IN AN ORGANIZED HEALTH CARE EDUCATION/TRAINING PROGRAM

## 2023-06-20 PROCEDURE — 84132 ASSAY OF SERUM POTASSIUM: CPT | Performed by: ANESTHESIOLOGY

## 2023-06-20 PROCEDURE — C1887 CATHETER, GUIDING: HCPCS | Performed by: STUDENT IN AN ORGANIZED HEALTH CARE EDUCATION/TRAINING PROGRAM

## 2023-06-20 PROCEDURE — C1760 CLOSURE DEV, VASC: HCPCS | Performed by: STUDENT IN AN ORGANIZED HEALTH CARE EDUCATION/TRAINING PROGRAM

## 2023-06-20 PROCEDURE — C1874 STENT, COATED/COV W/DEL SYS: HCPCS | Performed by: STUDENT IN AN ORGANIZED HEALTH CARE EDUCATION/TRAINING PROGRAM

## 2023-06-20 PROCEDURE — 86901 BLOOD TYPING SEROLOGIC RH(D): CPT

## 2023-06-20 PROCEDURE — 86900 BLOOD TYPING SEROLOGIC ABO: CPT | Performed by: STUDENT IN AN ORGANIZED HEALTH CARE EDUCATION/TRAINING PROGRAM

## 2023-06-20 PROCEDURE — 86900 BLOOD TYPING SEROLOGIC ABO: CPT

## 2023-06-20 DEVICE — STENTGR ENDOPROSTH VIABAHN VBX EXP 7F 7X11X29MM 135CM: Type: IMPLANTABLE DEVICE | Site: ARTERIAL | Status: FUNCTIONAL

## 2023-06-20 RX ORDER — LABETALOL HYDROCHLORIDE 5 MG/ML
5 INJECTION, SOLUTION INTRAVENOUS
Status: DISCONTINUED | OUTPATIENT
Start: 2023-06-20 | End: 2023-06-20 | Stop reason: HOSPADM

## 2023-06-20 RX ORDER — SODIUM CHLORIDE 0.9 % (FLUSH) 0.9 %
10 SYRINGE (ML) INJECTION AS NEEDED
Status: DISCONTINUED | OUTPATIENT
Start: 2023-06-20 | End: 2023-06-20 | Stop reason: HOSPADM

## 2023-06-20 RX ORDER — SODIUM CHLORIDE 9 MG/ML
9 INJECTION, SOLUTION INTRAVENOUS ONCE
Status: COMPLETED | OUTPATIENT
Start: 2023-06-20 | End: 2023-06-20

## 2023-06-20 RX ORDER — HYDRALAZINE HYDROCHLORIDE 20 MG/ML
5 INJECTION INTRAMUSCULAR; INTRAVENOUS
Status: DISCONTINUED | OUTPATIENT
Start: 2023-06-20 | End: 2023-06-20 | Stop reason: HOSPADM

## 2023-06-20 RX ORDER — SODIUM CHLORIDE, SODIUM LACTATE, POTASSIUM CHLORIDE, CALCIUM CHLORIDE 600; 310; 30; 20 MG/100ML; MG/100ML; MG/100ML; MG/100ML
9 INJECTION, SOLUTION INTRAVENOUS CONTINUOUS PRN
Status: DISCONTINUED | OUTPATIENT
Start: 2023-06-20 | End: 2023-06-20

## 2023-06-20 RX ORDER — LIDOCAINE HYDROCHLORIDE 10 MG/ML
INJECTION, SOLUTION INFILTRATION; PERINEURAL AS NEEDED
Status: DISCONTINUED | OUTPATIENT
Start: 2023-06-20 | End: 2023-06-20 | Stop reason: HOSPADM

## 2023-06-20 RX ORDER — DIPHENHYDRAMINE HYDROCHLORIDE 50 MG/ML
12.5 INJECTION INTRAMUSCULAR; INTRAVENOUS
Status: DISCONTINUED | OUTPATIENT
Start: 2023-06-20 | End: 2023-06-20 | Stop reason: HOSPADM

## 2023-06-20 RX ORDER — ONDANSETRON 2 MG/ML
4 INJECTION INTRAMUSCULAR; INTRAVENOUS ONCE AS NEEDED
Status: DISCONTINUED | OUTPATIENT
Start: 2023-06-20 | End: 2023-06-20 | Stop reason: HOSPADM

## 2023-06-20 RX ORDER — CLOPIDOGREL BISULFATE 75 MG/1
75 TABLET ORAL DAILY
Qty: 30 TABLET | Refills: 0 | Status: SHIPPED | OUTPATIENT
Start: 2023-06-20 | End: 2023-07-20

## 2023-06-20 RX ORDER — EPHEDRINE SULFATE 5 MG/ML
5 INJECTION INTRAVENOUS ONCE AS NEEDED
Status: DISCONTINUED | OUTPATIENT
Start: 2023-06-20 | End: 2023-06-20 | Stop reason: HOSPADM

## 2023-06-20 RX ORDER — SODIUM CHLORIDE 0.9 % (FLUSH) 0.9 %
10 SYRINGE (ML) INJECTION EVERY 12 HOURS SCHEDULED
Status: DISCONTINUED | OUTPATIENT
Start: 2023-06-20 | End: 2023-06-20 | Stop reason: HOSPADM

## 2023-06-20 RX ORDER — IPRATROPIUM BROMIDE AND ALBUTEROL SULFATE 2.5; .5 MG/3ML; MG/3ML
3 SOLUTION RESPIRATORY (INHALATION) ONCE AS NEEDED
Status: DISCONTINUED | OUTPATIENT
Start: 2023-06-20 | End: 2023-06-20 | Stop reason: HOSPADM

## 2023-06-20 RX ADMIN — SODIUM CHLORIDE 9 ML/HR: 9 INJECTION, SOLUTION INTRAVENOUS at 11:11

## 2023-06-20 NOTE — OP NOTE
Date of Admission:  6/20/2023 06/20/23  Dave Lira II, MD  [facility]    Preoperative Diagnosis:   Right subclavian artery stenosis, concern for steal syndrome    Postoperative Diagnosis:   Same    Procedure Performed:   Ultrasound-guided access right common femoral artery  Thoracic aortic arteriogram  Selective catheter placement right common femoral artery to right brachial artery  7 x 29 mm [place]vine VBX stent placed in right brachial artery, balloon expanded with 5 mm Fransico angioplasty balloon.    CPT:  78845 Thoracic Aortic Arteriogram  21337 Third or higher order catheter placement (contralateral PFA, SFA or distial or ipsilateral tibial)  13477 Ultrasound-guided percutaneous vascular access.  Pro-glide Perclose closure right common femoral arteriotomy    Surgeon:   Dave Lira II, MD    Assistant:    Samantha Abrams, Provided critical assistance in exposure, retraction, and suction that overall decrease blood loss and operative time.    Anesthesia:   MAC with local    Estimated Blood Loss:   25-50 cc    Findings:    Thoracic aortic arteriogram: Patent thoracic aorta without evidence of aneurysm.  Brachiocephalic artery patent, approximately 80% focal stenosis at origin of right subclavian artery with poststenotic dilation, patent right common a carotid artery without stenosis, prescription patent proximal left common carotid artery without evidence of stenosis, patent proximal left subclavian artery without evidence of stenosis, moderate calcified atherosclerotic disease of thoracic aorta.  To and fro flow noted in right vertebral artery consistent with preoperative carotid duplex findings/steal syndrome.   7 x 29 mm [place]vine VBX stent positioned over area of stenosis and consideration for treatment but was too long and would encroach upon right vertebral artery.  Decision made to remove the stent however during removal of the stent became dislodged from the balloon.  Stent was then pushed into the right  brachial artery.  7 mm VBX balloon exchanged for 5 mm balloon due to size of brachial artery and the VBX was inflated to 5 mm within the brachial artery.  Patient with good flow through brachial artery/stent on completion angiogram.  DP and PT signals present and radial and ulnar signals present in right arm at conclusion of procedure.        Implants:    Implant Name Type Inv. Item Serial No.  Lot No. LRB No. Used Action   STENTGR ENDOPROSTH VIABAHN VBX EXP 7F [id number] - [id number] - [id number] Implant STENTGR ENDOPROSTH VIABAHN VBX EXP 7F [id number] [id number] [employer]  Right 1 Implanted       Specimen:   none    Complications:   none    Access:  7 [place] retrograde access right common femoral artery    Closure:  Perclose closure device    Dispo:  To PACU    Indication for procedure:   82 y.o. female with right subclavian artery stenosis.  She had been previously seen in preop and was reporting some symptoms of intermittent dizziness.  Carotid duplex suggest subclavian steal syndrome.  Plans were made for thoracic aortogram with possible subclavian artery stent placement.  The nature of the procedure including risks benefits and alternatives were discussed with the patient who verbalized understanding and provided consent.    Description of procedure:   Patient was taken to the hybrid room where sedation was initiated by the anesthesia service.  The patient's groins were prepped with chlorhexidine bilaterally and she was draped in sterile fashion.  A timeout was performed prior to beginning the procedure.  Began procedure by marking the right femoral head under fluoroscopy.  We then used ultrasound to evaluate the right common femoral artery.  1% lidocaine was infiltrated around the right common femoral artery under ultrasound guidance.  We then used ultrasound to access the right common femoral artery with a microneedle and a microwire was inserted.  Placement of the needle over the  femoral head was confirmed on fluoroscopy.  We then exchanged the microneedle for microsheath.  A retrograde angiogram of our access was performed that confirmed appropriate placement, of note the patient has a relatively high bifurcation of her right common femoral artery.  Following this we advanced a 0.035 Glidewire advantage through the micro sheath and into the thoracic aorta.  The micro sheath was exchanged for a short 5 Bruneian sheath.  The patient was given 8000 units of heparin.  A pigtail catheter was advanced over the wire and advanced into the ascending thoracic aorta and thoracic aortic arteriogram was performed with the above listed findings.  We then exchanged our short 5 Bruneian sheath for 90 cm 7 Bruneian sheath and positioned this in the apex of the aorta.  A Peralta catheter was used to select the brachiocephalic artery and angiograms were performed through this in various positions.  The patient appeared to have approximately 80% stenosis of the origin of her right subclavian artery.  We were able to advance the Peralta catheter across and perform an angiogram distal to the lesion to confirm intraluminal placement, and after replacing the dilator within the 7 Bruneian sheath were able to advance it into the right brachiocephalic artery.  We then obtained a 7 mm x 29mm [place] VBX stent, and this was positioned within the lesion but appeared too long and would encroach upon the right vertebral artery.  As this was the shortest balloon expandable covered stent that seem to be appropriate size to treat the lesion I decided then to not deployed the stent but remove it.  Unfortunately while removing the stent the stent became dislodged off the balloon, but had not expanded.  We were able to use the [place] VBX balloon to push the stent into the right brachial artery.  The [place] VBX balloon was removed and 0.035 quick cross catheter was advanced over the wire and did progress across the lumen of the nonexpanded  stent.  We then exchanged our 0.035 wire for 0.018 Glidewire advantage.  We then removed the quick cross catheter and advanced a 5 x 20 mm Fransico balloon over the wire and it did pass within the lumen of the VBX stent.  We then used this to deployed the VBX stent within the right brachial artery and the mid upper arm.  Completion angiogram was performed that demonstrated good flow through the stent with no other stenosis within the brachial artery and patent proximal radial and ulnar arteries.  Doppler confirmed signals in the radial and ulnar arteries of the right hand.  We then retracted the 7 Albanian sheath into the infrarenal abdominal aorta.  The 0.018 Glidewire was exchanged for 0.035 Glidewire.  The 7 Albanian sheath was removed and a Pro-glide Perclose device was used to close the right common femoral arteriotomy.  Unusually, we did have 2 ProGlide devices not fire appropriately, but the third worked appropriately.  Pressure was held till hemostasis was obtained and multiphasic Doppler signals were obtained in the right DP and PT.  Patient was then aroused from sedation and transported to PACU in stable condition.  She tolerated the procedure well.  All wires catheters sheaths and other devices removed and found to be whole and intact prior to conclusion of procedure.  The only complication of the procedure was the dislodgment of the stent within the subclavian artery that had to be pushed into the brachial artery and then deployed.  I discussed the outcome of the procedure including the stent dislodgment and the rationale for deploying it within the brachial artery with the patient's son and grandson.      Daev Lira II, MD  06/20/23    Active Hospital Problems    Diagnosis  POA    **Subclavian arterial stenosis [I77.1]  Unknown      Resolved Hospital Problems   No resolved problems to display.

## 2023-06-20 NOTE — DISCHARGE INSTRUCTIONS
[facility]  Mark Muñiz, Britt, Alexandru Brooke,Paulie, Verónica  [address]  [place] [id number] [phone]      Discharge Instructions for Angiogram    Go home, rest and take it easy today.      You may experience some dizziness or memory loss from the anesthesia.  This may last for the next 24 hours.  Someone should plan on staying with you for the first 24 hours for your safety.    Do not make any important legal decisions or sign any legal papers for the next 24 hours.      Eat and drink lightly today.  Start off with liquids, jello, soup, crackers or other bland foods at first. You may advance your diet tomorrow as tolerated as long as you do not experience any nausea or vomiting.    You may resume routine medications including blood thinners. However, Glucophage should be not be started for 72 hours after the dye is given.      No lifting over 10 pounds and no strenuous activity for the next 2-3 days.    Try not to strain or bear down when your bowels move or when you empty your bladder.    Limit going up and down steps for 2 days.    No driving for the remainder of the day.  You may resume limited driving tomorrow if necessary.      You may shower tomorrow.  No bath or hot tubs for at least 2 days after the procedure.          Leave the pressure dressing on until tomorrow morning.  You may then take this off, as well as the small see through dressing with gauze tomorrow.  If it doesn’t come off easily, do not pull this off.  If it is stuck, shower and let the warm water loosen it before removal.       Check your groin dressing regularly for bleeding through the dressing (under the pressure dressing).  A small amount of blood contained by the gauze is normal; the whole dressing should not be filled with blood or leaking out under the sides.     If you experience bleeding through the gauze/clear sticky dressing, lay flat and have someone apply direct pressure for 15  minutes.  If bleeding has stopped after this, you may put a clean gauze and tape over the area.  Continue to lie flat for 1-2 hours.  If bleeding continues after 15 minutes of pressure, call us at the number listed above.  There is an MD available after hours.      If you experience heavy bleeding or large swelling, continue to hold firm pressure and              call 911.  Do not call the MD on call.      If you experience pain or discomfort you may take Tylenol or Ibuprofen, whichever you normally use for minor discomfort, unless otherwise instructed.        If the MD gives you a prescription for narcotic pain pills (Tylenol #3, Vicodin, Hydrocodone, Oxycodone or Percocet), you cannot drive a vehicle or operate machinery while taking these.     Severe pain is not expected after this procedure.  If you experience severe pain, please call our office at [phone].     Remember to contact our office for any of the following:    Fever > 101 degrees  Severe pain that cannot be controlled by taking your pain pills  Severe nausea or vomiting   Significant bleeding of your incisions  Drainage that has a bad smell or is yellow or green in appearance  Any other questions or concerns

## 2023-06-20 NOTE — H&P
Name: Susan Garcia ADMIT: 2023   : 1940  PCP: Adrienne Humphries MD    MRN: [id number] LOS: 0 days   AGE/SEX: 82 y.o. female  ROOM: [facility] MAIN OR/MAIN OR     No chief complaint on file.      Subjective   Patient is a 82 y.o. female presents for right arm arteriogram possible subclavian artery stent.  The patient is a pleasant 82-year-old lady I been following for few months now with intermittent episodes of dizziness and noted to have high-grade stenosis of the right subclavian artery when being worked up for respiratory distress.  Patient feels well today with no significant changes since she was last seen in clinic.  She is here with her son.    History of Present Illness    Past Medical History:   Diagnosis Date    Abnormal ECG     Anxiety     Atrial fibrillation     Celiac disease     Chronic kidney disease     Hyperlipidemia     Hypertension     Obesity (BMI 30-39.9) 2020    Peripheral arterial disease     Seizure disorder     Stage 3 chronic kidney disease      Past Surgical History:   Procedure Laterality Date    APPENDECTOMY      CHOLECYSTECTOMY      COLONOSCOPY      TONSILLECTOMY      TOTAL ABDOMINAL HYSTERECTOMY      TOTAL KNEE ARTHROPLASTY Bilateral      Family History   Problem Relation Age of Onset    COPD Mother     Stroke Father      Social History     Tobacco Use    Smoking status: Never    Smokeless tobacco: Never   Vaping Use    Vaping Use: Never used   Substance Use Topics    Alcohol use: No    Drug use: No     Medications Prior to Admission   Medication Sig Dispense Refill Last Dose    amLODIPine (NORVASC) 10 MG tablet Take 1 tablet by mouth Every Night.   2023    aspirin 81 MG chewable tablet Chew 1 tablet Daily.   2023    atorvastatin (LIPITOR) 10 MG tablet TAKE 1 TABLET BY MOUTH DAILY (Patient taking differently: Take 1 tablet by mouth Every Night.) 90 tablet 3 2023    carvedilol (COREG) 12.5 MG tablet Take 1 tablet by mouth 2 (Two) Times a Day  With Meals.   6/20/2023    DULoxetine (CYMBALTA) 30 MG capsule Take 1 capsule by mouth Daily.   6/19/2023    hydrochlorothiazide (HYDRODIURIL) 12.5 MG tablet Take 1 tablet by mouth Daily.   6/19/2023    losartan (COZAAR) 100 MG tablet TAKE 1 TABLET BY MOUTH EVERY DAY (Patient taking differently: Take 1 tablet by mouth Daily.) 90 tablet 1 6/20/2023     Allergies:  Patient has no known allergies.    Review of Systems     Objective    Vital Signs  Temp:  [97.8 °F (36.6 °C)] 97.8 °F (36.6 °C)  Heart Rate:  [57] 57  SpO2:  [95 %] 95 %  on   ;   Device (Oxygen Therapy): room air  Body mass index is 36.32 kg/m².    Physical Exam  Vitals reviewed.   Constitutional:       General: She is not in acute distress.  Eyes:      General: No scleral icterus.  Cardiovascular:      Rate and Rhythm: Normal rate and regular rhythm.   Pulmonary:      Effort: Pulmonary effort is normal. No respiratory distress.   Musculoskeletal:      Cervical back: Normal range of motion.   Skin:     General: Skin is warm and dry.   Neurological:      General: No focal deficit present.      Mental Status: She is alert and oriented to person, place, and time.      Comments: 5 out of 5 strength x4, cranial nerves II through XII are intact, sensation grossly intact       Results Review:   I reviewed the patient's new clinical results.  Imaging Results (Last 24 Hours)       ** No results found for the last 24 hours. **            Assessment & Plan       Subclavian arterial stenosis      Assessment & Plan  82-year-old lady with high-grade right subclavian artery stenosis with evidence of steal physiology on carotid duplex performed at outside facility.  The patient has some right arm pain and intermittent episodes of dizziness.  I discussed with the patient that based on what she describes her episodes of dizziness including that they are not necessarily associated with use of her right arm that these episodes may not necessarily be related to her subclavian  artery stenosis.  I discussed with her the risk of the procedure that include at least a 1% risk of stroke, at least 1% risk of bleeding, and other complications.  The patient verbalizes understanding of this and agrees to proceed with right arm arteriogram with possible stent placement in her right subclavian artery.  I did discuss with the patient that if the lesion abuts the vertebral artery we may not be able to treat endovascularly and she would require a right carotid subclavian bypass for treatment.        I discussed the patients findings and my recommendations with patient and family.          Dave Lira II, MD  [facility]  [phone]  06/20/23  12:55 EDT

## 2023-07-11 LAB — QT INTERVAL: 440 MS

## 2023-09-21 ENCOUNTER — OFFICE VISIT (OUTPATIENT)
Dept: CARDIOLOGY | Facility: CLINIC | Age: 83
End: 2023-09-21
Payer: MEDICARE

## 2023-09-21 VITALS
WEIGHT: 225 LBS | BODY MASS INDEX: 36.16 KG/M2 | SYSTOLIC BLOOD PRESSURE: 146 MMHG | DIASTOLIC BLOOD PRESSURE: 74 MMHG | HEIGHT: 66 IN | OXYGEN SATURATION: 97 % | HEART RATE: 60 BPM

## 2023-09-21 DIAGNOSIS — I10 ESSENTIAL HYPERTENSION: Chronic | ICD-10-CM

## 2023-09-21 DIAGNOSIS — E78.5 DYSLIPIDEMIA: Chronic | ICD-10-CM

## 2023-09-21 DIAGNOSIS — E78.2 MIXED HYPERLIPIDEMIA: ICD-10-CM

## 2023-09-21 DIAGNOSIS — I73.9 PAD (PERIPHERAL ARTERY DISEASE): ICD-10-CM

## 2023-09-21 DIAGNOSIS — I77.1 SUBCLAVIAN ARTERIAL STENOSIS: Primary | ICD-10-CM

## 2023-09-21 DIAGNOSIS — R09.89 BILATERAL CAROTID BRUITS: ICD-10-CM

## 2023-09-21 NOTE — PROGRESS NOTES
Cardiology Office Visit      Encounter Date:  09/21/2023    Patient ID:   Susan Garcia is a 83 y.o. female.      Reason For Followup:  Hypertension  Hyperlipidemia  Significant symptomatic right subclavian stenosis    Brief Clinical History:  Dear Dr. Humphries, Adrienne Rice MD    I had the pleasure of seeing Susan Garcia today. As you are well aware, this is a 83 y.o. female past medical history there is significant for history of hypertension hyperlipidemia and also peripheral vascular disease here for follow-up       Interval History:  Patient denies any further episodes of atrial fibrillation and also says that she is under a lot of stress at home is probably what contributed to atrial fibrillation  Denies any chest pain shortness of breath dizziness or syncope  Denies any further symptoms of palpitations fluttering dizziness or syncope  Recent diagnosis of cyst severe right subclavian stenosis with symptoms    Assessment & Plan    Impressions:  1.  hypertension:  patient was initially seen and evaluated for poorly controlled hypertension patient did have some extensive workup for secondary hypertension all those were negative and patient is currently doing better blood pressure is well controlled with the current treatment.   patient had a prior renal CT angiogram showing no evidence of any renal artery stenosis   continue home blood pressure monitoring  Blood pressure is controlled with current therapy     2.  hyperlipidemia:  patient is currently on statin.      3.  Mild renal insufficiency, secondary to hypertensive nephropathy.  Continue close monitoring on renal function, follow-up with Nephrology    4.  Peripheral arterial disease, carotid ultrasound with less than 50% stenosis/recent repeat screening vascular ultrasound study with a mild carotid stenosis bilaterally    5.  Paroxysmal atrial fibrillation currently in sinus rhythm, unable to take the metoprolol secondary to side effects patient is  back on Coreg and tolerating it well    Severe stenosis involving the right subclavian artery /attempted intervention to the right subclavian artery.  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  Moderate carotid stenosis involving bilateral carotid arteries  Significant blood pressure difference between the brachial systolic pressure between the right and left side    Patient is currently off anticoagulation therapy  Risk benefits and alternatives reviewed and discussed with patient  Prior a stress test and echocardiogram with no significant findings  Stress test with fixed perfusion defect likely attenuation artifact  Echocardiogram with normal LV systolic function    Recommendations:  Holter monitor with no further evidence of atrial fibrillation  Patient does not want take anticoagulation she is off Xarelto  Close monitoring for any recurrence of atrial fibrillation  Risk benefits and alternatives reviewed and discussed with the patient  Continue close monitoring  Continue follow-up with nephrology  Labs with primary care physician office  Need for continued aggressive risk factor modification close monitoring of blood pressure reviewed and discussed with patient  Follow-up with primary care physician and nephrology  Continue current medical therapy  Lipids are optimal  Continue current medical therapy with Norvasc 10 mg p.o. once a day aspirin 81 mg p.o. once a day Lipitor 10 mg p.o. once a day Coreg 12.5 mg p.o. twice daily losartan 100 mg p.o. once a day  Follow-up with vascular surgery for the subclavian stenosis  Follow-up in office in 6 months            Lab Results   Component Value Date    GLUCOSE 107 (H) 06/12/2023    BUN 32 (H) 06/12/2023    CREATININE 1.42 (H) 06/12/2023    EGFR 37.5 (L) 06/12/2023    BCR 22.5 06/12/2023    K 4.3 06/20/2023    CO2 27.9 06/12/2023    CALCIUM 10.9 (H) 06/12/2023    ALBUMIN 3.40 (L)  "07/07/2020    BILITOT 0.2 07/07/2020    AST 16 07/07/2020    ALT 16 07/07/2020     Results for orders placed during the hospital encounter of 06/23/20    Adult Transthoracic Echo Complete W/ Cont if Necessary Per Protocol    Interpretation Summary  · Left ventricular wall thickness is consistent with concentric hypertrophy.  · Estimated EF = 65%.  · Left ventricular systolic function is normal.  · Left atrial cavity size is mildly dilated.  · Left ventricular diastolic dysfunction (grade I) consistent with impaired relaxation.     No results found for this or any previous visit.     Lab Results   Component Value Date    CHLPL 164 03/01/2019    TRIG 78 03/01/2019    HDL 58 03/01/2019    LDL 90 03/01/2019      Results for orders placed during the hospital encounter of 06/23/20    Stress Test With Myocardial Perfusion One Day    Interpretation Summary  · Moderate sized severe intensity fixed inferolateral wall perfusion defect with no evidence of any significant reversible ischemia  · Left ventricular ejection fraction is hyperdynamic (Calculated EF > 70%).  · Gated imaging shows normal wall motion in this territory possibility of underlying attenuation artifact cannot be ruled out based on the present study  · Clinical correlation recommended   Results for orders placed during the hospital encounter of 07/29/20    Holter Monitor - 48 Hour    Interpretation Summary  · A relatively benign monitor study.           Objective:    Vitals:  Vitals:    09/21/23 1100   BP: 146/74   BP Location: Left arm   Patient Position: Sitting   Pulse: 60   SpO2: 97%   Weight: 102 kg (225 lb)   Height: 167.6 cm (66\")       Physical Exam:    General: Alert, cooperative, no distress, appears stated age  Head:  Normocephalic, atraumatic, mucous membranes moist  Eyes:  Conjunctiva/corneas clear, EOM's intact     Neck:  Supple,  no adenopathy;      Lungs: Clear to auscultation bilaterally, no wheezes rhonchi rales are noted  Chest wall: No " tenderness  Heart::  Regular rate and rhythm, S1 and S2 normal, no murmur, rub or gallop  Abdomen: Soft, non-tender, nondistended bowel sounds active  Extremities: No cyanosis, clubbing, or edema  Pulses: 2+ and symmetric all extremities  Skin:  No rashes or lesions  Neuro/psych: A&O x3. CN II through XII are grossly intact with appropriate affect      Allergies:  No Known Allergies    Medication Review:     Current Outpatient Medications:     amLODIPine (NORVASC) 10 MG tablet, Take 1 tablet by mouth Every Night., Disp: , Rfl:     aspirin 81 MG chewable tablet, Chew 1 tablet Daily., Disp: , Rfl:     atorvastatin (LIPITOR) 10 MG tablet, TAKE 1 TABLET BY MOUTH DAILY (Patient taking differently: Take 1 tablet by mouth Every Night.), Disp: 90 tablet, Rfl: 3    carvedilol (COREG) 12.5 MG tablet, Take 1 tablet by mouth 2 (Two) Times a Day With Meals., Disp: , Rfl:     DULoxetine (CYMBALTA) 30 MG capsule, Take 1 capsule by mouth Daily., Disp: , Rfl:     hydrochlorothiazide (HYDRODIURIL) 12.5 MG tablet, Take 1 tablet by mouth Daily., Disp: , Rfl:     losartan (COZAAR) 100 MG tablet, TAKE 1 TABLET BY MOUTH EVERY DAY, Disp: 90 tablet, Rfl: 1    Family History:  Family History   Problem Relation Age of Onset    COPD Mother     Stroke Father        Past Medical History:  Past Medical History:   Diagnosis Date    Abnormal ECG     Anxiety     Atrial fibrillation     Celiac disease     Chronic kidney disease     Hyperlipidemia     Hypertension     Obesity (BMI 30-39.9) 06/23/2020    Peripheral arterial disease     Seizure disorder     Stage 3 chronic kidney disease        Past surgical History:  Past Surgical History:   Procedure Laterality Date    APPENDECTOMY      ARTERIOGRAM Right 6/20/2023    Procedure: right arm arteriogram with possible angioplasty stent placement BRACHIAL ARTERY;  Surgeon: Dave Lira II, MD;  Location: [facility] OR;  Service: Vascular;  Laterality: Right;    CHOLECYSTECTOMY      COLONOSCOPY       TONSILLECTOMY      TOTAL ABDOMINAL HYSTERECTOMY      TOTAL KNEE ARTHROPLASTY Bilateral        Social History:  Social History     Socioeconomic History    Marital status:    Tobacco Use    Smoking status: Never    Smokeless tobacco: Never   Vaping Use    Vaping Use: Never used   Substance and Sexual Activity    Alcohol use: No    Drug use: No    Sexual activity: Defer       Review of Systems:  The following systems were reviewed as they relate to the cardiovascular system: Constitutional, Eyes, ENT, Cardiovascular, Respiratory, Gastrointestinal, Integumentary, Neurological, Psychiatric, Hematologic, Endocrine, Musculoskeletal, and Genitourinary. The pertinent cardiovascular findings are reported above with all other cardiovascular points within those systems being negative.    Diagnostic Study Review:     Current Electrocardiogram:  Procedures      Advance Care Planning   ACP discussion was held with the patient during this visit. Patient has an advance directive in EMR which is still valid.         NOTE: The following portions of the patient's history were reviewed and updated this visit as appropriate: allergies, current medications, past family history, past medical history, past social history, past surgical history and problem list.

## 2023-12-20 RX ORDER — LOSARTAN POTASSIUM 100 MG/1
TABLET ORAL
Qty: 90 TABLET | Refills: 0 | Status: SHIPPED | OUTPATIENT
Start: 2023-12-20

## 2024-01-30 NOTE — PROGRESS NOTES
Subjective: History of dizziness    Patient ID: Susan Garcia is a 83 y.o. female.    CHIEF COMPLAINT: History of dizziness and syncope    History of Present Illness Ms. Garcia is a very pleasant 83-year-old  female who presented alone referred as a new patient for syncope by Dr. Lira.  The patient states today that after she had her subclavian stent placed and her blood pressure pills changed that she has had no more dizziness or syncope.  She states she feels very good.    The patient follows with Dr. Caputo with cardiology and has previously had a holter monitor performed due to a history of paroxysmal atrial fibrillation that was not seen on the Holter monitor test.         Complaint: Dizziness  Onset: 3 years ago  Quality: feels off balance. Has passed out before  Duration: few minutes  Frequency:  has not happened since she moved out of her house in Aug.  Timing: anyitime  Worsening factors: nothing  Alleviating factors: nothing  Associated Signs and symptoms:   none  Current meds: None past Medications none      Dr. Lira [facility]  7/11/2023  History of Present Illness:  The patient is a pleasant 82-year-old lady with a history of right subclavian artery stenosis with angiographic steal syndrome and intermittent episodes of dizziness.  The patient is left-handed and has some right shoulder pain but does not have any consistent arm claudication symptoms.  She underwent a right arm arteriogram due to some concern that her intermittent dizzy spells which occur about once every 2-3 moths could be related to subclavian artery stenosis.  Unfortunately, the stenosis encroached too much on the vertebral artery in order to place a stent but with sizing it from stent place ment, the balloon expandable stent dislodged from the balloon and we had to push it into the brachial artery and to play it there to prevent causing a stroke.  The patient has done well since this procedure.  She has  no  access site complications.  She denies any episodes of her disease spells since then.  She denies any unilateral arm or leg weakness, numbness, slurred speech, facial droop or vision changes.  She has no acute complaints today.  She has had no further episodes of dizzy spells.         The following portions of the patient's history were reviewed and updated as appropriate: allergies, current medications, past family history, past medical history, past social history, past surgical history and problem list.      Family History   Problem Relation Age of Onset    COPD Mother     Stroke Father        Past Medical History:   Diagnosis Date    Abnormal ECG     Anxiety     Atrial fibrillation     Celiac disease     Chronic kidney disease     Hyperlipidemia     Hypertension     Obesity (BMI 30-39.9) 06/23/2020    Peripheral arterial disease     Seizure disorder     Stage 3 chronic kidney disease        Social History     Socioeconomic History    Marital status:    Tobacco Use    Smoking status: Never    Smokeless tobacco: Never   Vaping Use    Vaping Use: Never used   Substance and Sexual Activity    Alcohol use: No    Drug use: No    Sexual activity: Defer         Current Outpatient Medications:     amLODIPine (NORVASC) 10 MG tablet, Take 1 tablet by mouth Every Night., Disp: , Rfl:     aspirin 81 MG chewable tablet, Chew 1 tablet Daily., Disp: , Rfl:     atorvastatin (LIPITOR) 10 MG tablet, TAKE 1 TABLET BY MOUTH DAILY (Patient taking differently: Take 1 tablet by mouth Every Night.), Disp: 90 tablet, Rfl: 3    carvedilol (COREG) 12.5 MG tablet, Take 1 tablet by mouth 2 (Two) Times a Day With Meals., Disp: , Rfl:     DULoxetine (CYMBALTA) 30 MG capsule, Take 1 capsule by mouth Daily., Disp: , Rfl:     hydrochlorothiazide (HYDRODIURIL) 12.5 MG tablet, Take 1 tablet by mouth Daily., Disp: , Rfl:     losartan (COZAAR) 100 MG tablet, TAKE 1 TABLET BY MOUTH EVERY DAY, Disp: 90 tablet, Rfl: 0    Review of Systems    Constitutional:  Positive for fatigue.   HENT:  Positive for hearing loss, sinus pressure, sneezing, tinnitus and trouble swallowing.    Cardiovascular:  Positive for leg swelling.   Gastrointestinal:  Positive for anal bleeding, blood in stool, constipation, diarrhea and rectal pain.   Endocrine: Positive for cold intolerance and polyphagia.   Genitourinary:  Positive for decreased urine volume and hematuria.   Musculoskeletal:  Positive for arthralgias, back pain and neck pain.   Allergic/Immunologic: Positive for environmental allergies and immunocompromised state.   Neurological:  Positive for dizziness, seizures, syncope and numbness.   Hematological:  Bruises/bleeds easily.   Psychiatric/Behavioral:  The patient is nervous/anxious.    All other systems reviewed and are negative.       I have reviewed ROS completed by medical assistant.     Objective:    Neurologic Exam     Mental Status   Oriented to person, place, and time.   Oriented to person.   Oriented to place.   Oriented to time.   Attention: normal. Concentration: normal.   Speech: speech is normal   Level of consciousness: alert  Knowledge: good and consistent with education.   Normal comprehension.     Cranial Nerves     CN II   Visual fields full to confrontation.   Visual acuity: normal  Right visual field deficit: none  Left visual field deficit: none     CN III, IV, VI   Pupils are equal, round, and reactive to light.  Extraocular motions are normal.   Right pupil: Shape: regular. Reactivity: brisk. Consensual response: intact. Accommodation: intact.   Left pupil: Shape: regular. Reactivity: brisk. Consensual response: intact. Accommodation: intact.   CN III: no CN III palsy  CN VI: no CN VI palsy  Nystagmus: none   Diplopia: none  Ophthalmoparesis: none  Upgaze: normal  Downgaze: normal  Conjugate gaze: present  Vestibulo-ocular reflex: present    CN V   Facial sensation intact.     CN VII   Facial expression full, symmetric.     CN VIII   CN  VIII normal.     CN IX, X   CN IX normal.   CN X normal.   Palate: symmetric    CN XI   CN XI normal.   Right sternocleidomastoid strength: normal  Left sternocleidomastoid strength: normal  Right trapezius strength: normal  Left trapezius strength: normal    CN XII   CN XII normal.   Tongue: not atrophic    Motor Exam   Muscle bulk: normal  Overall muscle tone: normal  Right arm tone: normal  Left arm tone: normal  Right arm pronator drift: absent  Left arm pronator drift: absent  Right leg tone: normal  Left leg tone: normal    Strength   Strength 5/5 throughout.   Right neck flexion: 5/5  Left neck flexion: 5/5  Right neck extension: 5/5  Left neck extension: 5/5  Right deltoid: 5/5  Left deltoid: 5/5  Right biceps: 5/5  Left biceps: 5/5  Right triceps: 5/5  Left triceps: 5/5  Right quadriceps: 5/5  Left quadriceps: 5/5  Right hamstrin/5  Left hamstrin/5  Right glutei: 5/5  Left glutei: 5/5  Right anterior tibial: 5/5  Left anterior tibial: 5/5  Right posterior tibial: 5/5  Left posterior tibial: 5/5  Right peroneal: 5/5  Left peroneal: 5/5  Right gastroc: 5/5  Left gastroc: 5/5    Sensory Exam   Light touch normal.   Right leg vibration: normal  Left leg vibration: normal    Gait, Coordination, and Reflexes     Gait  Gait: normal    Coordination   Romberg: negative  Finger to nose coordination: normal  Heel to shin coordination: normal  Tandem walking coordination: normal    Tremor   Resting tremor: absent  Intention tremor: absent  Action tremor: absent    Reflexes   Right brachioradialis: 2+  Left brachioradialis: 2+  Right biceps: 2+  Left biceps: 2+  Right triceps: 2+  Left triceps: 2+  Right patellar: 2+  Left patellar: 2+  Right achilles: 2+  Left achilles: 2+  Right plantar: equivocal  Left plantar: equivocal  Right Toussaint: absent  Left Toussaint: absent  Right ankle clonus: absent  Left ankle clonus: absent  Right pendular knee jerk: absent  Left pendular knee jerk: absent      Physical  Exam  Vitals reviewed.   Constitutional:       Appearance: Normal appearance. She is well-developed, well-groomed and overweight.   HENT:      Head: Normocephalic and atraumatic.      Right Ear: Hearing normal.      Left Ear: Hearing normal.      Nose: Nose normal.      Mouth/Throat:      Lips: Pink.      Mouth: Mucous membranes are moist.   Eyes:      General: Lids are normal. No visual field deficit.     Extraocular Movements: EOM normal.      Right eye: Normal extraocular motion and no nystagmus.      Left eye: Normal extraocular motion and no nystagmus.      Pupils: Pupils are equal, round, and reactive to light.   Cardiovascular:      Rate and Rhythm: Normal rate.      Pulses:           Carotid pulses are 2+ on the right side and 2+ on the left side.     Heart sounds: Normal heart sounds, S1 normal and S2 normal.   Pulmonary:      Effort: Pulmonary effort is normal.      Breath sounds: Normal breath sounds and air entry.   Musculoskeletal:         General: Normal range of motion.      Cervical back: Full passive range of motion without pain and normal range of motion.   Skin:     General: Skin is warm and dry.   Neurological:      Mental Status: She is alert and oriented to person, place, and time.      Cranial Nerves: No dysarthria or facial asymmetry.      Sensory: No sensory deficit.      Motor: Motor strength is normal.No weakness, tremor, atrophy, abnormal muscle tone, seizure activity or pronator drift.      Coordination: Romberg sign negative. Coordination normal. Finger-Nose-Finger Test, Heel to Shin Test, Romberg Test and Heel to Shin Test normal. Rapid alternating movements normal.      Gait: Gait is intact. Gait and tandem walk normal.      Deep Tendon Reflexes: Babinski sign absent on the right side. Babinski sign absent on the left side.      Reflex Scores:       Tricep reflexes are 2+ on the right side and 2+ on the left side.       Bicep reflexes are 2+ on the right side and 2+ on the left  side.       Brachioradialis reflexes are 2+ on the right side and 2+ on the left side.       Patellar reflexes are 2+ on the right side and 2+ on the left side.       Achilles reflexes are 2+ on the right side and 2+ on the left side.  Psychiatric:         Attention and Perception: Attention and perception normal.         Mood and Affect: Mood normal.         Speech: Speech normal.         Behavior: Behavior is cooperative.       Assessment/Plan:  Discussion: The patient reports all dizziness and syncope has resolved and that she feels excellent.  The patient will be referred back to her primary care and can come back to neurology if needed on a as needed basis.    Diagnoses and all orders for this visit:    1. H/O syncope (Primary)        Return if symptoms worsen or fail to improve.    I spent 22 minutes caring for Susan on this date of service. This time includes time spent by me in the following activities: obtaining and/or reviewing a separately obtained history, performing a medically appropriate examination and/or evaluation, counseling and educating the patient/family/caregiver, ordering medications, tests, or procedures, documenting information in the medical record, and independently interpreting results and communicating that information with the patient/family/caregiver.      This document has been electronically signed by Elidia HEALY on January 31, 2024 14:52 EST

## 2024-01-31 ENCOUNTER — OFFICE VISIT (OUTPATIENT)
Dept: NEUROLOGY | Facility: CLINIC | Age: 84
End: 2024-01-31
Payer: MEDICARE

## 2024-01-31 VITALS
SYSTOLIC BLOOD PRESSURE: 155 MMHG | WEIGHT: 225 LBS | BODY MASS INDEX: 36.16 KG/M2 | HEART RATE: 61 BPM | DIASTOLIC BLOOD PRESSURE: 78 MMHG | HEIGHT: 66 IN

## 2024-01-31 DIAGNOSIS — Z87.898 H/O SYNCOPE: Primary | ICD-10-CM

## 2024-01-31 PROCEDURE — 1160F RVW MEDS BY RX/DR IN RCRD: CPT | Performed by: NURSE PRACTITIONER

## 2024-01-31 PROCEDURE — 3078F DIAST BP <80 MM HG: CPT | Performed by: NURSE PRACTITIONER

## 2024-01-31 PROCEDURE — 1159F MED LIST DOCD IN RCRD: CPT | Performed by: NURSE PRACTITIONER

## 2024-01-31 PROCEDURE — 99202 OFFICE O/P NEW SF 15 MIN: CPT | Performed by: NURSE PRACTITIONER

## 2024-01-31 PROCEDURE — 3077F SYST BP >= 140 MM HG: CPT | Performed by: NURSE PRACTITIONER

## 2024-02-02 ENCOUNTER — PATIENT ROUNDING (BHMG ONLY) (OUTPATIENT)
Dept: NEUROLOGY | Facility: CLINIC | Age: 84
End: 2024-02-02
Payer: MEDICARE

## 2024-03-21 ENCOUNTER — OFFICE VISIT (OUTPATIENT)
Dept: CARDIOLOGY | Facility: CLINIC | Age: 84
End: 2024-03-21
Payer: MEDICARE

## 2024-03-21 VITALS
HEART RATE: 70 BPM | SYSTOLIC BLOOD PRESSURE: 144 MMHG | OXYGEN SATURATION: 98 % | BODY MASS INDEX: 36.16 KG/M2 | DIASTOLIC BLOOD PRESSURE: 84 MMHG | WEIGHT: 225 LBS | HEIGHT: 66 IN

## 2024-03-21 DIAGNOSIS — I10 ESSENTIAL HYPERTENSION: ICD-10-CM

## 2024-03-21 DIAGNOSIS — I73.9 PAD (PERIPHERAL ARTERY DISEASE): ICD-10-CM

## 2024-03-21 DIAGNOSIS — Z01.810 PREOP CARDIOVASCULAR EXAM: Primary | ICD-10-CM

## 2024-03-21 DIAGNOSIS — E78.2 MIXED HYPERLIPIDEMIA: ICD-10-CM

## 2024-03-21 DIAGNOSIS — I77.1 SUBCLAVIAN ARTERIAL STENOSIS: ICD-10-CM

## 2024-03-21 PROCEDURE — 1159F MED LIST DOCD IN RCRD: CPT | Performed by: INTERNAL MEDICINE

## 2024-03-21 PROCEDURE — 1160F RVW MEDS BY RX/DR IN RCRD: CPT | Performed by: INTERNAL MEDICINE

## 2024-03-21 PROCEDURE — 99214 OFFICE O/P EST MOD 30 MIN: CPT | Performed by: INTERNAL MEDICINE

## 2024-03-21 PROCEDURE — 3079F DIAST BP 80-89 MM HG: CPT | Performed by: INTERNAL MEDICINE

## 2024-03-21 PROCEDURE — 3077F SYST BP >= 140 MM HG: CPT | Performed by: INTERNAL MEDICINE

## 2024-03-21 PROCEDURE — 93000 ELECTROCARDIOGRAM COMPLETE: CPT | Performed by: INTERNAL MEDICINE

## 2024-03-21 RX ORDER — ALBUTEROL SULFATE 90 UG/1
2 AEROSOL, METERED RESPIRATORY (INHALATION)
COMMUNITY
Start: 2024-03-02

## 2024-03-21 NOTE — PROGRESS NOTES
Cardiology Office Visit      Encounter Date:  03/21/2024    Patient ID:   Susan Garcia is a 83 y.o. female.    Reason For Followup:  Hypertension  Hyperlipidemia  Significant symptomatic right subclavian stenosis  Preop evaluation for noncardiac surgery    Brief Clinical History:  Dear Dr. Humphries, Adrienne Rice MD    I had the pleasure of seeing Susan Garcia today. As you are well aware, this is a 83 y.o. female past medical history there is significant for history of hypertension hyperlipidemia and also peripheral vascular disease here for follow-up       Interval History:  Recent diagnosis of breast cancer currently being evaluated by surgical team for possible surgical excision of the tumor  Denies any chest pain shortness of breath dizziness or syncope  Denies any further symptoms of palpitations fluttering dizziness or syncope      Assessment & Plan    Impressions:  1.  hypertension:  patient was initially seen and evaluated for poorly controlled hypertension patient did have some extensive workup for secondary hypertension all those were negative and patient is currently doing better blood pressure is well controlled with the current treatment.   patient had a prior renal CT angiogram showing no evidence of any renal artery stenosis   continue home blood pressure monitoring  Blood pressure is controlled with current therapy     2.  hyperlipidemia:  patient is currently on statin.      3.  Mild renal insufficiency, secondary to hypertensive nephropathy.  Continue close monitoring on renal function, follow-up with Nephrology    4.  Peripheral arterial disease, carotid ultrasound with less than 50% stenosis/recent repeat screening vascular ultrasound study with a mild carotid stenosis bilaterally    5.  Paroxysmal atrial fibrillation currently in sinus rhythm, unable to take the metoprolol secondary to side effects patient is back on Coreg and tolerating it well    Severe stenosis involving the right  "subclavian artery /attempted intervention to the right subclavian artery.  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  Moderate carotid stenosis involving bilateral carotid arteries  Significant blood pressure difference between the brachial systolic pressure between the right and left side    Patient is currently off anticoagulation therapy  Risk benefits and alternatives reviewed and discussed with patient  Prior a stress test and echocardiogram with no significant findings  Stress test with fixed perfusion defect likely attenuation artifact  Echocardiogram with normal LV systolic function  Preop evaluation for noncardiac surgery for breast tumor    Recommendations:  Holter monitor with no further evidence of atrial fibrillation  Patient does not want take anticoagulation she is off Xarelto  Close monitoring for any recurrence of atrial fibrillation  Risk benefits and alternatives reviewed and discussed with the patient  Continue close monitoring  Continue follow-up with nephrology  Labs with primary care physician office  Need for continued aggressive risk factor modification close monitoring of blood pressure reviewed and discussed with patient  Follow-up with primary care physician and nephrology  Follow-up with oncology as scheduled  Continue current medical therapy  Lipids are optimal  Continue current medical therapy with Norvasc 10 mg p.o. once a day aspirin 81 mg p.o. once a day Lipitor 10 mg p.o. once a day Coreg 12.5 mg p.o. twice daily losartan 100 mg p.o. once a day  Follow-up with vascular surgery for the subclavian stenosis  Schedule for a myocardial perfusion study for further restratification before the surgical clearance  Follow-up in office in 6 months        Vitals:  Vitals:    03/21/24 1134   BP: 144/84   Pulse: 70   SpO2: 98%   Weight: 102 kg (225 lb)   Height: 167.6 cm (66\")       Physical " Exam:    General: Alert, cooperative, no distress, appears stated age  Head:  Normocephalic, atraumatic, mucous membranes moist  Eyes:  Conjunctiva/corneas clear, EOM's intact     Neck:  Supple,  no adenopathy;      Lungs: Clear to auscultation bilaterally, no wheezes rhonchi rales are noted  Chest wall: No tenderness  Heart::  Regular rate and rhythm, S1 and S2 normal, no murmur, rub or gallop  Abdomen: Soft, non-tender, nondistended bowel sounds active  Extremities: No cyanosis, clubbing, or edema  Pulses: 2+ and symmetric all extremities  Skin:  No rashes or lesions  Neuro/psych: A&O x3. CN II through XII are grossly intact with appropriate affect              Lab Results   Component Value Date    GLUCOSE 107 (H) 06/12/2023    BUN 32 (H) 06/12/2023    CREATININE 1.42 (H) 06/12/2023    EGFR 37.5 (L) 06/12/2023    BCR 22.5 06/12/2023    K 4.3 06/20/2023    CO2 27.9 06/12/2023    CALCIUM 10.9 (H) 06/12/2023    ALBUMIN 3.40 (L) 07/07/2020    BILITOT 0.2 07/07/2020    AST 16 07/07/2020    ALT 16 07/07/2020     Results for orders placed during the hospital encounter of 06/23/20    Adult Transthoracic Echo Complete W/ Cont if Necessary Per Protocol    Interpretation Summary  · Left ventricular wall thickness is consistent with concentric hypertrophy.  · Estimated EF = 65%.  · Left ventricular systolic function is normal.  · Left atrial cavity size is mildly dilated.  · Left ventricular diastolic dysfunction (grade I) consistent with impaired relaxation.     No results found for this or any previous visit.     Lab Results   Component Value Date    CHLPL 164 03/01/2019    TRIG 78 03/01/2019    HDL 58 03/01/2019    LDL 90 03/01/2019      Results for orders placed during the hospital encounter of 06/23/20    Stress Test With Myocardial Perfusion One Day    Interpretation Summary  · Moderate sized severe intensity fixed inferolateral wall perfusion defect with no evidence of any significant reversible ischemia  · Left  ventricular ejection fraction is hyperdynamic (Calculated EF > 70%).  · Gated imaging shows normal wall motion in this territory possibility of underlying attenuation artifact cannot be ruled out based on the present study  · Clinical correlation recommended   Results for orders placed during the hospital encounter of 07/29/20    Holter Monitor - 48 Hour    Interpretation Summary  · A relatively benign monitor study.           Objective:          Allergies:  No Known Allergies    Medication Review:     Current Outpatient Medications:     albuterol sulfate  (90 Base) MCG/ACT inhaler, Inhale 2 puffs., Disp: , Rfl:     amLODIPine (NORVASC) 10 MG tablet, Take 1 tablet by mouth Every Night., Disp: , Rfl:     atorvastatin (LIPITOR) 10 MG tablet, TAKE 1 TABLET BY MOUTH DAILY (Patient taking differently: Take 1 tablet by mouth Every Night.), Disp: 90 tablet, Rfl: 3    carvedilol (COREG) 12.5 MG tablet, Take 1 tablet by mouth 2 (Two) Times a Day With Meals., Disp: , Rfl:     DULoxetine (CYMBALTA) 30 MG capsule, Take 1 capsule by mouth Daily., Disp: , Rfl:     hydrochlorothiazide (HYDRODIURIL) 12.5 MG tablet, Take 1 tablet by mouth Daily., Disp: , Rfl:     losartan (COZAAR) 100 MG tablet, TAKE 1 TABLET BY MOUTH EVERY DAY, Disp: 90 tablet, Rfl: 0    aspirin 81 MG chewable tablet, Chew 1 tablet Daily. (Patient not taking: Reported on 3/21/2024), Disp: , Rfl:     Family History:  Family History   Problem Relation Age of Onset    COPD Mother     Stroke Father     Hypertension Father        Past Medical History:  Past Medical History:   Diagnosis Date    Abnormal ECG     Anxiety     Atrial fibrillation     Cancer Breast left    Celiac disease     Chronic kidney disease     Hyperlipidemia     Hypertension     Obesity (BMI 30-39.9) 06/23/2020    Peripheral arterial disease     Seizure disorder     Stage 3 chronic kidney disease        Past surgical History:  Past Surgical History:   Procedure Laterality Date    APPENDECTOMY       ARTERIOGRAM Right 6/20/2023    Procedure: right arm arteriogram with possible angioplasty stent placement BRACHIAL ARTERY;  Surgeon: Dave Lira II, MD;  Location: [facility];  Service: Vascular;  Laterality: Right;    CHOLECYSTECTOMY      COLONOSCOPY      TONSILLECTOMY      TOTAL ABDOMINAL HYSTERECTOMY      TOTAL KNEE ARTHROPLASTY Bilateral        Social History:  Social History     Socioeconomic History    Marital status:    Tobacco Use    Smoking status: Never     Passive exposure: Never    Smokeless tobacco: Never   Vaping Use    Vaping status: Never Used   Substance and Sexual Activity    Alcohol use: No    Drug use: No    Sexual activity: Not Currently     Partners: Male     Birth control/protection: None, Hysterectomy       Review of Systems:  The following systems were reviewed as they relate to the cardiovascular system: Constitutional, Eyes, ENT, Cardiovascular, Respiratory, Gastrointestinal, Integumentary, Neurological, Psychiatric, Hematologic, Endocrine, Musculoskeletal, and Genitourinary. The pertinent cardiovascular findings are reported above with all other cardiovascular points within those systems being negative.    Diagnostic Study Review:     Current Electrocardiogram:    ECG 12 Lead    Date/Time: 3/21/2024 12:13 PM  Performed by: Cecilia Caputo MD    Authorized by: Cecilia Caputo MD  Comparison: compared with previous ECG   Similar to previous ECG  Rhythm: sinus rhythm  Rate: normal  BPM: 69  Conduction: right bundle branch block  QRS axis: normal  Other findings: non-specific ST-T wave changes    Clinical impression: abnormal EKG                NOTE: The following portions of the patient's history were reviewed and updated this visit as appropriate: allergies, current medications, past family history, past medical history, past social history, past surgical history and problem list.

## 2024-03-22 RX ORDER — LOSARTAN POTASSIUM 100 MG/1
TABLET ORAL
Qty: 90 TABLET | Refills: 0 | Status: SHIPPED | OUTPATIENT
Start: 2024-03-22

## 2024-06-20 RX ORDER — ATORVASTATIN CALCIUM 10 MG/1
10 TABLET, FILM COATED ORAL NIGHTLY
Qty: 90 TABLET | Refills: 0 | Status: SHIPPED | OUTPATIENT
Start: 2024-06-20

## 2024-06-20 RX ORDER — LOSARTAN POTASSIUM 100 MG/1
TABLET ORAL
Qty: 90 TABLET | Refills: 0 | Status: SHIPPED | OUTPATIENT
Start: 2024-06-20

## 2024-09-13 RX ORDER — LOSARTAN POTASSIUM 100 MG/1
TABLET ORAL
Qty: 90 TABLET | Refills: 0 | Status: SHIPPED | OUTPATIENT
Start: 2024-09-13

## 2024-09-13 RX ORDER — ATORVASTATIN CALCIUM 10 MG/1
10 TABLET, FILM COATED ORAL NIGHTLY
Qty: 90 TABLET | Refills: 0 | Status: SHIPPED | OUTPATIENT
Start: 2024-09-13

## 2024-10-04 ENCOUNTER — OFFICE VISIT (OUTPATIENT)
Dept: CARDIOLOGY | Facility: CLINIC | Age: 84
End: 2024-10-04
Payer: MEDICARE

## 2024-10-04 VITALS
HEIGHT: 66 IN | BODY MASS INDEX: 36.83 KG/M2 | WEIGHT: 229.2 LBS | DIASTOLIC BLOOD PRESSURE: 77 MMHG | SYSTOLIC BLOOD PRESSURE: 147 MMHG | HEART RATE: 50 BPM

## 2024-10-04 DIAGNOSIS — E78.5 DYSLIPIDEMIA: Chronic | ICD-10-CM

## 2024-10-04 DIAGNOSIS — I77.1 SUBCLAVIAN ARTERIAL STENOSIS: Primary | ICD-10-CM

## 2024-10-04 DIAGNOSIS — I10 ESSENTIAL HYPERTENSION: Chronic | ICD-10-CM

## 2024-10-04 DIAGNOSIS — E78.2 MIXED HYPERLIPIDEMIA: ICD-10-CM

## 2024-10-04 DIAGNOSIS — I73.9 PAD (PERIPHERAL ARTERY DISEASE): ICD-10-CM

## 2024-10-04 RX ORDER — TAMOXIFEN CITRATE 20 MG/1
20 TABLET ORAL DAILY
COMMUNITY

## 2024-10-04 NOTE — PROGRESS NOTES
Cardiology Office Visit      Encounter Date:  10/04/2024    Patient ID:   Susan Garcia is a 84 y.o. female.    Reason For Followup:  Hypertension  Hyperlipidemia      Brief Clinical History:  Dear Dr. Humphries, Adrienne Rice MD    I had the pleasure of seeing Susan Garcia today. As you are well aware, this is a 84 y.o. female past medical history there is significant for history of hypertension hyperlipidemia and also peripheral vascular disease here for follow-up       Interval History:    Denies any chest pain shortness of breath dizziness or syncope  Denies any further symptoms of palpitations fluttering dizziness or syncope  Home blood pressure readings are optimal  Patient is currently on tamoxifen.  Recent diagnosis of breast cancer status postlumpectomy    Assessment & Plan    Impressions:  1.  hypertension:  Blood pressure is controlled with current therapy     2.  hyperlipidemia:  patient is currently on statin.      3.  Mild renal insufficiency, secondary to hypertensive nephropathy.  Continue close monitoring on renal function, follow-up with Nephrology    4.  Peripheral arterial disease,     5.  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  Moderate carotid stenosis involving bilateral carotid arteries  Significant blood pressure difference between the brachial systolic pressure between the right and left side    Patient is currently off anticoagulation therapy  Risk benefits and alternatives reviewed and discussed with patient  Prior a stress test and echocardiogram with no significant findings  Stress test with fixed perfusion defect likely  "attenuation artifact  Echocardiogram with normal LV systolic function    Recommendations:    Close monitoring for any recurrence of atrial fibrillation  Risk benefits and alternatives reviewed and discussed with the patient  Continue close monitoring  Continue follow-up with nephrology  Labs with primary care physician office  Need for continued aggressive risk factor modification close monitoring of blood pressure reviewed and discussed with patient  Follow-up with primary care physician and nephrology  Follow-up with oncology as scheduled  Continue current medical therapy  Lipids are optimal  Continue current medical therapy with Norvasc 10 mg p.o. once a day aspirin 81 mg p.o. once a day Lipitor 10 mg p.o. once a day Coreg 12.5 mg p.o. twice daily losartan 100 mg p.o. once a day hydrochlorothiazide 12.5 mg p.o. once a day  Follow-up with vascular surgery for the subclavian stenosis  Follow-up in office in 6 months        Vitals:  Vitals:    10/04/24 1105   BP: 147/77   Pulse: 50   Weight: 104 kg (229 lb 3.2 oz)   Height: 167.6 cm (66\")       Physical Exam:    General: Alert, cooperative, no distress, appears stated age  Head:  Normocephalic, atraumatic, mucous membranes moist  Eyes:  Conjunctiva/corneas clear, EOM's intact     Neck:  Supple,  no adenopathy;      Lungs: Clear to auscultation bilaterally, no wheezes rhonchi rales are noted  Chest wall: No tenderness  Heart::  Regular rate and rhythm, S1 and S2 normal, no murmur, rub or gallop  Abdomen: Soft, non-tender, nondistended bowel sounds active  Extremities: No cyanosis, clubbing, or edema  Pulses: 2+ and symmetric all extremities  Skin:  No rashes or lesions  Neuro/psych: A&O x3. CN II through XII are grossly intact with appropriate affect              Lab Results   Component Value Date    GLUCOSE 107 (H) 06/12/2023    BUN 32 (H) 06/12/2023    CREATININE 1.42 (H) 06/12/2023    EGFR 37.5 (L) 06/12/2023    BCR 22.5 06/12/2023    K 4.3 06/20/2023    CO2 27.9 " 06/12/2023    CALCIUM 10.9 (H) 06/12/2023    ALBUMIN 3.40 (L) 07/07/2020    BILITOT 0.2 07/07/2020    AST 16 07/07/2020    ALT 16 07/07/2020     Results for orders placed during the hospital encounter of 06/23/20    Adult Transthoracic Echo Complete W/ Cont if Necessary Per Protocol    Interpretation Summary  · Left ventricular wall thickness is consistent with concentric hypertrophy.  · Estimated EF = 65%.  · Left ventricular systolic function is normal.  · Left atrial cavity size is mildly dilated.  · Left ventricular diastolic dysfunction (grade I) consistent with impaired relaxation.     No results found for this or any previous visit.     Lab Results   Component Value Date    CHLPL 164 03/01/2019    TRIG 78 03/01/2019    HDL 58 03/01/2019    LDL 90 03/01/2019      Results for orders placed during the hospital encounter of 06/23/20    Stress Test With Myocardial Perfusion One Day    Interpretation Summary  · Moderate sized severe intensity fixed inferolateral wall perfusion defect with no evidence of any significant reversible ischemia  · Left ventricular ejection fraction is hyperdynamic (Calculated EF > 70%).  · Gated imaging shows normal wall motion in this territory possibility of underlying attenuation artifact cannot be ruled out based on the present study  · Clinical correlation recommended   Results for orders placed during the hospital encounter of 07/29/20    Holter Monitor - 48 Hour    Interpretation Summary  · A relatively benign monitor study.           Objective:          Allergies:  No Known Allergies    Medication Review:     Current Outpatient Medications:     amLODIPine (NORVASC) 10 MG tablet, Take 1 tablet by mouth Every Night., Disp: , Rfl:     aspirin 81 MG chewable tablet, Chew 1 tablet Daily., Disp: , Rfl:     atorvastatin (LIPITOR) 10 MG tablet, TAKE 1 TABLET BY MOUTH EVERY NIGHT, Disp: 90 tablet, Rfl: 0    carvedilol (COREG) 12.5 MG tablet, Take 1 tablet by mouth 2 (Two) Times a Day With  Meals., Disp: , Rfl:     hydrochlorothiazide (HYDRODIURIL) 12.5 MG tablet, Take 1 tablet by mouth Daily., Disp: , Rfl:     losartan (COZAAR) 100 MG tablet, TAKE 1 TABLET BY MOUTH EVERY DAY, Disp: 90 tablet, Rfl: 0    tamoxifen (NOLVADEX) 20 MG chemo tablet, Take 1 tablet by mouth Daily., Disp: , Rfl:     Family History:  Family History   Problem Relation Age of Onset    COPD Mother     Stroke Father     Hypertension Father        Past Medical History:  Past Medical History:   Diagnosis Date    Abnormal ECG     Anxiety     Atrial fibrillation     Cancer Breast left    Celiac disease     Chronic kidney disease     Hyperlipidemia     Hypertension     Obesity (BMI 30-39.9) 06/23/2020    Peripheral arterial disease     Seizure disorder     Stage 3 chronic kidney disease        Past surgical History:  Past Surgical History:   Procedure Laterality Date    APPENDECTOMY      ARTERIOGRAM Right 06/20/2023    Procedure: right arm arteriogram with possible angioplasty stent placement BRACHIAL ARTERY;  Surgeon: Dave Lira II, MD;  Location: [facility] OR;  Service: Vascular;  Laterality: Right;    BREAST LUMPECTOMY Left 05/02/2024    CHOLECYSTECTOMY      COLONOSCOPY      TONSILLECTOMY      TOTAL ABDOMINAL HYSTERECTOMY      TOTAL KNEE ARTHROPLASTY Bilateral        Social History:  Social History     Socioeconomic History    Marital status:    Tobacco Use    Smoking status: Never     Passive exposure: Never    Smokeless tobacco: Never   Vaping Use    Vaping status: Never Used   Substance and Sexual Activity    Alcohol use: No    Drug use: No    Sexual activity: Not Currently     Partners: Male     Birth control/protection: None, Hysterectomy       Review of Systems:  The following systems were reviewed as they relate to the cardiovascular system: Constitutional, Eyes, ENT, Cardiovascular, Respiratory, Gastrointestinal, Integumentary, Neurological, Psychiatric, Hematologic, Endocrine, Musculoskeletal, and  Genitourinary. The pertinent cardiovascular findings are reported above with all other cardiovascular points within those systems being negative.    Diagnostic Study Review:     Current Electrocardiogram:    ECG 12 Lead    Date/Time: 10/4/2024 12:23 PM  Performed by: Cecilia Caputo MD    Authorized by: Cecilia Caputo MD  Comparison: compared with previous ECG   Similar to previous ECG  Rhythm: sinus rhythm and sinus bradycardia  Rate: normal  BPM: 50  Conduction: right bundle branch block  QRS axis: normal    Clinical impression: abnormal EKG                NOTE: The following portions of the patient's history were reviewed and updated this visit as appropriate: allergies, current medications, past family history, past medical history, past social history, past surgical history and problem list.

## 2024-12-10 RX ORDER — LOSARTAN POTASSIUM 100 MG/1
TABLET ORAL
Qty: 90 TABLET | Refills: 0 | OUTPATIENT
Start: 2024-12-10

## 2024-12-10 RX ORDER — LOSARTAN POTASSIUM 100 MG/1
TABLET ORAL
Qty: 90 TABLET | Refills: 0 | Status: SHIPPED | OUTPATIENT
Start: 2024-12-10

## 2024-12-13 RX ORDER — ATORVASTATIN CALCIUM 10 MG/1
10 TABLET, FILM COATED ORAL NIGHTLY
Qty: 90 TABLET | Refills: 0 | Status: SHIPPED | OUTPATIENT
Start: 2024-12-13

## 2025-03-17 RX ORDER — LOSARTAN POTASSIUM 100 MG/1
100 TABLET ORAL DAILY
Qty: 90 TABLET | Refills: 0 | Status: SHIPPED | OUTPATIENT
Start: 2025-03-17

## 2025-03-20 RX ORDER — ATORVASTATIN CALCIUM 10 MG/1
10 TABLET, FILM COATED ORAL NIGHTLY
Qty: 90 TABLET | Refills: 0 | Status: SHIPPED | OUTPATIENT
Start: 2025-03-20

## 2025-04-29 ENCOUNTER — OFFICE VISIT (OUTPATIENT)
Dept: CARDIOLOGY | Facility: CLINIC | Age: 85
End: 2025-04-29
Payer: MEDICARE

## 2025-04-29 VITALS
HEART RATE: 62 BPM | WEIGHT: 229 LBS | SYSTOLIC BLOOD PRESSURE: 144 MMHG | OXYGEN SATURATION: 95 % | HEIGHT: 66 IN | DIASTOLIC BLOOD PRESSURE: 83 MMHG | BODY MASS INDEX: 36.8 KG/M2

## 2025-04-29 DIAGNOSIS — I10 ESSENTIAL HYPERTENSION: Chronic | ICD-10-CM

## 2025-04-29 DIAGNOSIS — E78.5 DYSLIPIDEMIA: Chronic | ICD-10-CM

## 2025-04-29 DIAGNOSIS — I73.9 PAD (PERIPHERAL ARTERY DISEASE): ICD-10-CM

## 2025-04-29 DIAGNOSIS — R00.1 BRADYCARDIA: Primary | ICD-10-CM

## 2025-04-29 DIAGNOSIS — I77.1 SUBCLAVIAN ARTERIAL STENOSIS: ICD-10-CM

## 2025-04-29 PROCEDURE — 3077F SYST BP >= 140 MM HG: CPT | Performed by: INTERNAL MEDICINE

## 2025-04-29 PROCEDURE — 99214 OFFICE O/P EST MOD 30 MIN: CPT | Performed by: INTERNAL MEDICINE

## 2025-04-29 PROCEDURE — 3079F DIAST BP 80-89 MM HG: CPT | Performed by: INTERNAL MEDICINE

## 2025-04-29 PROCEDURE — 1160F RVW MEDS BY RX/DR IN RCRD: CPT | Performed by: INTERNAL MEDICINE

## 2025-04-29 PROCEDURE — 1159F MED LIST DOCD IN RCRD: CPT | Performed by: INTERNAL MEDICINE

## 2025-04-29 PROCEDURE — 93000 ELECTROCARDIOGRAM COMPLETE: CPT | Performed by: INTERNAL MEDICINE

## 2025-04-29 NOTE — PROGRESS NOTES
Cardiology Office Visit      Encounter Date:  04/29/2025    Patient ID:   Susan Garcia is a 84 y.o. female.    Reason For Followup:  Hypertension  Hyperlipidemia      Brief Clinical History:  Dear Dr. Humphries, Adrienne Rice MD    I had the pleasure of seeing Susan Garcia today. As you are well aware, this is a 84 y.o. female past medical history there is significant for history of hypertension hyperlipidemia and also peripheral vascular disease here for follow-up       Interval History:    Denies any chest pain shortness of breath dizziness or syncope  Denies any further symptoms of palpitations fluttering dizziness or syncope  Home blood pressure readings are optimal  Denies any new cardiac symptoms    Assessment & Plan    Impressions:  1.  hypertension:  Blood pressure is controlled with current therapy     2.  hyperlipidemia:  patient is currently on statin.      3.  Mild renal insufficiency, secondary to hypertensive nephropathy.  Continue close monitoring on renal function, follow-up with Nephrology    4.  Peripheral arterial disease,     5.  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  Moderate carotid stenosis involving bilateral carotid arteries  Significant blood pressure difference between the brachial systolic pressure between the right and left side    Patient is currently off anticoagulation therapy  Risk benefits and alternatives reviewed and discussed with patient  Prior a stress test and echocardiogram with no significant findings  Stress test with fixed perfusion defect likely attenuation artifact  Echocardiogram with normal LV  "systolic function    Recommendations:    Close monitoring for any recurrence of atrial fibrillation  Risk benefits and alternatives reviewed and discussed with the patient  Continue close monitoring  Continue follow-up with nephrology  Labs with primary care physician office  Need for continued aggressive risk factor modification close monitoring of blood pressure reviewed and discussed with patient  Follow-up with primary care physician and nephrology  Follow-up with oncology as scheduled  Continue current medical therapy  Lipids are optimal  Continue current medical therapy with Norvasc 10 mg p.o. once a day aspirin 81 mg p.o. once a day Lipitor 10 mg p.o. once a day Coreg 12.5 mg p.o. twice daily losartan 100 mg p.o. once a day hydrochlorothiazide 12.5 mg p.o. once a day  Labs medications reviewed and discussed patient  Continued aggressive risk factor modification  Recent labs and workup reviewed and discussed with patient  Follow-up with vascular surgery for the subclavian stenosis  Follow-up in office in 6 months        Vitals:  Vitals:    04/29/25 0926   BP: 144/83   BP Location: Left arm   Patient Position: Sitting   Cuff Size: Large Adult   Pulse: 62   SpO2: 95%   Weight: 104 kg (229 lb)   Height: 167.6 cm (66\")       Physical Exam:    General: Alert, cooperative, no distress, appears stated age  Head:  Normocephalic, atraumatic, mucous membranes moist  Eyes:  Conjunctiva/corneas clear, EOM's intact     Neck:  Supple,  no adenopathy;      Lungs: Clear to auscultation bilaterally, no wheezes rhonchi rales are noted  Chest wall: No tenderness  Heart::  Regular rate and rhythm, S1 and S2 normal, no murmur, rub or gallop  Abdomen: Soft, non-tender, nondistended bowel sounds active  Extremities: No cyanosis, clubbing, or edema  Pulses: 2+ and symmetric all extremities  Skin:  No rashes or lesions  Neuro/psych: A&O x3. CN II through XII are grossly intact with appropriate affect              Lab Results "   Component Value Date    GLUCOSE 107 (H) 06/12/2023    BUN 32 (H) 06/12/2023    CREATININE 1.42 (H) 06/12/2023    EGFR 37.5 (L) 06/12/2023    BCR 22.5 06/12/2023    K 4.3 06/20/2023    CO2 27.9 06/12/2023    CALCIUM 10.9 (H) 06/12/2023    ALBUMIN 3.40 (L) 07/07/2020    BILITOT 0.2 07/07/2020    AST 16 07/07/2020    ALT 16 07/07/2020     Results for orders placed during the hospital encounter of 06/23/20    Adult Transthoracic Echo Complete W/ Cont if Necessary Per Protocol    Interpretation Summary  · Left ventricular wall thickness is consistent with concentric hypertrophy.  · Estimated EF = 65%.  · Left ventricular systolic function is normal.  · Left atrial cavity size is mildly dilated.  · Left ventricular diastolic dysfunction (grade I) consistent with impaired relaxation.     No results found for this or any previous visit.     Lab Results   Component Value Date    CHLPL 164 03/01/2019    TRIG 78 03/01/2019    HDL 58 03/01/2019    LDL 90 03/01/2019      Results for orders placed during the hospital encounter of 06/23/20    Stress Test With Myocardial Perfusion One Day    Interpretation Summary  · Moderate sized severe intensity fixed inferolateral wall perfusion defect with no evidence of any significant reversible ischemia  · Left ventricular ejection fraction is hyperdynamic (Calculated EF > 70%).  · Gated imaging shows normal wall motion in this territory possibility of underlying attenuation artifact cannot be ruled out based on the present study  · Clinical correlation recommended   Results for orders placed during the hospital encounter of 07/29/20    Holter Monitor - 48 Hour    Interpretation Summary  · A relatively benign monitor study.           Objective:          Allergies:  No Known Allergies    Medication Review:     Current Outpatient Medications:     amLODIPine (NORVASC) 10 MG tablet, Take 1 tablet by mouth Every Night., Disp: , Rfl:     aspirin 81 MG chewable tablet, Chew 1 tablet Daily.,  Disp: , Rfl:     atorvastatin (LIPITOR) 10 MG tablet, TAKE 1 TABLET BY MOUTH EVERY NIGHT, Disp: 90 tablet, Rfl: 0    carvedilol (COREG) 12.5 MG tablet, Take 1 tablet by mouth 2 (Two) Times a Day With Meals., Disp: , Rfl:     hydrochlorothiazide (HYDRODIURIL) 12.5 MG tablet, Take 1 tablet by mouth Daily., Disp: , Rfl:     losartan (COZAAR) 100 MG tablet, TAKE 1 TABLET BY MOUTH EVERY DAY, Disp: 90 tablet, Rfl: 0    Family History:  Family History   Problem Relation Age of Onset    COPD Mother     Stroke Mother     Stroke Father     Hypertension Father        Past Medical History:  Past Medical History:   Diagnosis Date    Abnormal ECG     Anxiety     Atrial fibrillation     Cancer Breast left    Celiac disease     Chronic kidney disease     HL (hearing loss) 2022    Hyperlipidemia     Hypertension     Memory loss Not sure    Obesity (BMI 30-39.9) 06/23/2020    Peripheral arterial disease     Seizure disorder     Stage 3 chronic kidney disease        Past surgical History:  Past Surgical History:   Procedure Laterality Date    APPENDECTOMY      ARTERIOGRAM Right 06/20/2023    Procedure: right arm arteriogram with possible angioplasty stent placement BRACHIAL ARTERY;  Surgeon: Dave Lira II, MD;  Location: [facility] OR;  Service: Vascular;  Laterality: Right;    BREAST LUMPECTOMY Left 05/02/2024    CHOLECYSTECTOMY      COLONOSCOPY      TONSILLECTOMY      TOTAL ABDOMINAL HYSTERECTOMY      TOTAL KNEE ARTHROPLASTY Bilateral        Social History:  Social History     Socioeconomic History    Marital status:    Tobacco Use    Smoking status: Never     Passive exposure: Never    Smokeless tobacco: Never   Vaping Use    Vaping status: Never Used   Substance and Sexual Activity    Alcohol use: No    Drug use: No    Sexual activity: Not Currently     Partners: Male     Birth control/protection: None, Hysterectomy       Review of Systems:  The following systems were reviewed as they relate to the cardiovascular  system: Constitutional, Eyes, ENT, Cardiovascular, Respiratory, Gastrointestinal, Integumentary, Neurological, Psychiatric, Hematologic, Endocrine, Musculoskeletal, and Genitourinary. The pertinent cardiovascular findings are reported above with all other cardiovascular points within those systems being negative.    Diagnostic Study Review:     Current Electrocardiogram:    ECG 12 Lead    Date/Time: 4/29/2025 10:11 AM  Performed by: Cecilia Caputo MD    Authorized by: Cecilia Caputo MD  Comparison: compared with previous ECG   Similar to previous ECG  Rhythm: sinus rhythm  Rate: normal  BPM: 62  Conduction: right bundle branch block  ST Segments: ST segments normal  T Waves: T waves normal  QRS axis: normal    Clinical impression: normal ECG        Advance Care Planning   ACP discussion was held with the patient during this visit. Patient has an advance directive in EMR which is still valid.             NOTE: The following portions of the patient's history were reviewed and updated this visit as appropriate: allergies, current medications, past family history, past medical history, past social history, past surgical history and problem list.

## 2025-06-16 RX ORDER — LOSARTAN POTASSIUM 100 MG/1
100 TABLET ORAL DAILY
Qty: 90 TABLET | Refills: 0 | Status: SHIPPED | OUTPATIENT
Start: 2025-06-16

## 2025-06-27 RX ORDER — ATORVASTATIN CALCIUM 10 MG/1
10 TABLET, FILM COATED ORAL NIGHTLY
Qty: 90 TABLET | Refills: 0 | Status: SHIPPED | OUTPATIENT
Start: 2025-06-27

## 2025-08-04 ENCOUNTER — HOSPITAL ENCOUNTER (OUTPATIENT)
Facility: HOSPITAL | Age: 85
Discharge: HOME | End: 2025-08-04
Attending: EMERGENCY MEDICINE | Admitting: EMERGENCY MEDICINE
Payer: MEDICARE

## 2025-08-04 VITALS
HEIGHT: 66 IN | RESPIRATION RATE: 18 BRPM | TEMPERATURE: 98.3 F | HEART RATE: 69 BPM | WEIGHT: 231.6 LBS | SYSTOLIC BLOOD PRESSURE: 183 MMHG | OXYGEN SATURATION: 96 % | DIASTOLIC BLOOD PRESSURE: 88 MMHG | BODY MASS INDEX: 37.22 KG/M2

## 2025-08-04 DIAGNOSIS — M62.838 MUSCLE SPASMS OF NECK: Primary | ICD-10-CM

## 2025-08-04 PROCEDURE — G0463 HOSPITAL OUTPT CLINIC VISIT: HCPCS | Performed by: NURSE PRACTITIONER

## 2025-08-04 RX ORDER — TIZANIDINE HYDROCHLORIDE 2 MG/1
2 CAPSULE, GELATIN COATED ORAL 3 TIMES DAILY
Qty: 15 CAPSULE | Refills: 0 | Status: SHIPPED | OUTPATIENT
Start: 2025-08-04 | End: 2025-08-09

## (undated) DEVICE — PTA BALLOON DILATATION CATHETER: Brand: STERLING™

## (undated) DEVICE — DEV INFL COMPAK W/ACCESSPLUS IN4530

## (undated) DEVICE — IR MAJOR VASCULAR PACK: Brand: MEDLINE INDUSTRIES, INC.

## (undated) DEVICE — RADIFOCUS GLIDEWIRE ADVANTAGE GUIDEWIRE: Brand: GLIDEWIRE ADVANTAGE

## (undated) DEVICE — RADIFOCUS TORQUE DEVICE MULTI-TORQUE VISE: Brand: RADIFOCUS TORQUE DEVICE

## (undated) DEVICE — CATH SZ ACCUVU SEG/20CM PIG 5F 100CM

## (undated) DEVICE — UNDRGLV SURG BIOGEL PIMICROINDICATOR SYNTH SZ7.5PF STRL PR/2

## (undated) DEVICE — PINNACLE INTRODUCER SHEATH: Brand: PINNACLE

## (undated) DEVICE — LN INJ CONTRST FLXCIL HP F/M LL 1200PSI72

## (undated) DEVICE — PERCLOSE PROGLIDE™ SUTURE-MEDIATED CLOSURE SYSTEM: Brand: PERCLOSE PROGLIDE™

## (undated) DEVICE — SYRINGE KIT,PACKAGED,,150FT,MK 7(ANGIO-ARTERION, 150ML SYR KIT W/QFT,MC)(60729385): Brand: MEDRAD® MARK 7 ARTERION DISPOSABLE SYRINGE 150 ML WITH QUICK FILL TUBE

## (undated) DEVICE — COVER,LIGHT HANDLE,FLX,1/PK: Brand: MEDLINE INDUSTRIES, INC.

## (undated) DEVICE — KT SURG TURNOVER 050

## (undated) DEVICE — Device

## (undated) DEVICE — EXTENSION SET, MALE LUER LOCK ADAPTER WITH RETRACTABLE COLLAR

## (undated) DEVICE — SOL IRRIG NACL 1000ML

## (undated) DEVICE — DESTINATION CAROTID GUIDING SHEATH: Brand: DESTINATION

## (undated) DEVICE — ST ACC MICROPUNCTURE STFF .018 ECHO/PLDM/TP 4F/10CM 21G/7CM

## (undated) DEVICE — QUICK-CROSS™SELECT SUPPORT CATHETER: Brand: QUICK-CROSS™SELECT

## (undated) DEVICE — SOL NS 500ML

## (undated) DEVICE — GLV SURG BIOGEL LTX PF 7 1/2

## (undated) DEVICE — MICRO TIP WIPE: Brand: DEVON

## (undated) DEVICE — PROVE COVER: Brand: UNBRANDED